# Patient Record
Sex: FEMALE | Race: BLACK OR AFRICAN AMERICAN | Employment: FULL TIME | URBAN - METROPOLITAN AREA
[De-identification: names, ages, dates, MRNs, and addresses within clinical notes are randomized per-mention and may not be internally consistent; named-entity substitution may affect disease eponyms.]

---

## 2017-02-08 ENCOUNTER — HOSPITAL ENCOUNTER (EMERGENCY)
Age: 52
Discharge: HOME | End: 2017-02-08
Attending: FAMILY MEDICINE

## 2017-02-08 VITALS
RESPIRATION RATE: 18 BRPM | TEMPERATURE: 99.2 F | WEIGHT: 202 LBS | BODY MASS INDEX: 37.17 KG/M2 | SYSTOLIC BLOOD PRESSURE: 136 MMHG | OXYGEN SATURATION: 97 % | HEIGHT: 62 IN | DIASTOLIC BLOOD PRESSURE: 77 MMHG | HEART RATE: 100 BPM

## 2017-02-08 DIAGNOSIS — J20.9 ACUTE BRONCHITIS, UNSPECIFIED ORGANISM: Primary | ICD-10-CM

## 2017-02-08 RX ORDER — CODEINE PHOSPHATE AND GUAIFENESIN 10; 100 MG/5ML; MG/5ML
5 SOLUTION ORAL
Qty: 118 ML | Refills: 0 | Status: SHIPPED | OUTPATIENT
Start: 2017-02-08 | End: 2017-11-19

## 2017-02-08 RX ORDER — PREDNISONE 5 MG/1
60 TABLET ORAL
Status: COMPLETED | OUTPATIENT
Start: 2017-02-08 | End: 2017-02-08

## 2017-02-08 RX ORDER — DOXYCYCLINE HYCLATE 100 MG
100 TABLET ORAL 2 TIMES DAILY
Qty: 20 TAB | Refills: 0 | Status: SHIPPED | OUTPATIENT
Start: 2017-02-08 | End: 2017-02-18

## 2017-02-08 RX ORDER — PREDNISONE 20 MG/1
60 TABLET ORAL DAILY
Qty: 15 TAB | Refills: 0 | Status: SHIPPED | OUTPATIENT
Start: 2017-02-08 | End: 2017-02-13

## 2017-02-08 RX ADMIN — PREDNISONE 60 MG: 5 TABLET ORAL at 09:53

## 2017-02-08 NOTE — DISCHARGE INSTRUCTIONS
Bronchitis: Care Instructions  Your Care Instructions    Bronchitis is inflammation of the bronchial tubes, which carry air to the lungs. The tubes swell and produce mucus, or phlegm. The mucus and inflamed bronchial tubes make you cough. You may have trouble breathing. Most cases of bronchitis are caused by viruses like those that cause colds. Antibiotics usually do not help and they may be harmful. Bronchitis usually develops rapidly and lasts about 2 to 3 weeks in otherwise healthy people. Follow-up care is a key part of your treatment and safety. Be sure to make and go to all appointments, and call your doctor if you are having problems. It's also a good idea to know your test results and keep a list of the medicines you take. How can you care for yourself at home? · Take all medicines exactly as prescribed. Call your doctor if you think you are having a problem with your medicine. · Get some extra rest.  · Take an over-the-counter pain medicine, such as acetaminophen (Tylenol), ibuprofen (Advil, Motrin), or naproxen (Aleve) to reduce fever and relieve body aches. Read and follow all instructions on the label. · Do not take two or more pain medicines at the same time unless the doctor told you to. Many pain medicines have acetaminophen, which is Tylenol. Too much acetaminophen (Tylenol) can be harmful. · Take an over-the-counter cough medicine that contains dextromethorphan to help quiet a dry, hacking cough so that you can sleep. Avoid cough medicines that have more than one active ingredient. Read and follow all instructions on the label. · Breathe moist air from a humidifier, hot shower, or sink filled with hot water. The heat and moisture will thin mucus so you can cough it out. · Do not smoke. Smoking can make bronchitis worse. If you need help quitting, talk to your doctor about stop-smoking programs and medicines. These can increase your chances of quitting for good.   When should you call for help? Call 911 anytime you think you may need emergency care. For example, call if:  · You have severe trouble breathing. Call your doctor now or seek immediate medical care if:  · You have new or worse trouble breathing. · You cough up dark brown or bloody mucus (sputum). · You have a new or higher fever. · You have a new rash. Watch closely for changes in your health, and be sure to contact your doctor if:  · You cough more deeply or more often, especially if you notice more mucus or a change in the color of your mucus. · You are not getting better as expected. Where can you learn more? Go to http://[place]-stan.info/. Enter H333 in the search box to learn more about \"Bronchitis: Care Instructions. \"  Current as of: May 23, 2016  Content Version: 11.1  © 2741-7183 [employer]. Care instructions adapted under license by [employer]. (which disclaims liability or warranty for this information). If you have questions about a medical condition or this instruction, always ask your healthcare professional. [place] [address] any warranty or liability for your use of this information. Bronchitis: Care Instructions  Your Care Instructions    Bronchitis is inflammation of the bronchial tubes, which carry air to the lungs. The tubes swell and produce mucus, or phlegm. The mucus and inflamed bronchial tubes make you cough. You may have trouble breathing. Most cases of bronchitis are caused by viruses like those that cause colds. Antibiotics usually do not help and they may be harmful. Bronchitis usually develops rapidly and lasts about 2 to 3 weeks in otherwise healthy people. Follow-up care is a key part of your treatment and safety. Be sure to make and go to all appointments, and call your doctor if you are having problems. It's also a good idea to know your test results and keep a list of the medicines you take.   How can you care for yourself at home? · Take all medicines exactly as prescribed. Call your doctor if you think you are having a problem with your medicine. · Get some extra rest.  · Take an over-the-counter pain medicine, such as acetaminophen (Tylenol), ibuprofen (Advil, Motrin), or naproxen (Aleve) to reduce fever and relieve body aches. Read and follow all instructions on the label. · Do not take two or more pain medicines at the same time unless the doctor told you to. Many pain medicines have acetaminophen, which is Tylenol. Too much acetaminophen (Tylenol) can be harmful. · Take an over-the-counter cough medicine that contains dextromethorphan to help quiet a dry, hacking cough so that you can sleep. Avoid cough medicines that have more than one active ingredient. Read and follow all instructions on the label. · Breathe moist air from a humidifier, hot shower, or sink filled with hot water. The heat and moisture will thin mucus so you can cough it out. · Do not smoke. Smoking can make bronchitis worse. If you need help quitting, talk to your doctor about stop-smoking programs and medicines. These can increase your chances of quitting for good. When should you call for help? Call 911 anytime you think you may need emergency care. For example, call if:  · You have severe trouble breathing. Call your doctor now or seek immediate medical care if:  · You have new or worse trouble breathing. · You cough up dark brown or bloody mucus (sputum). · You have a new or higher fever. · You have a new rash. Watch closely for changes in your health, and be sure to contact your doctor if:  · You cough more deeply or more often, especially if you notice more mucus or a change in the color of your mucus. · You are not getting better as expected. Where can you learn more? Go to http://[place]org[place]-stan.info/. Enter H333 in the search box to learn more about \"Bronchitis: Care Instructions. \"  Current as of: May 23, 2016  Content Version: 11.1  © 3545-0020 [employer]. Care instructions adapted under license by [employer] (which disclaims liability or warranty for this information). If you have questions about a medical condition or this instruction, always ask your healthcare professional. [address] any warranty or liability for your use of this information.

## 2017-02-08 NOTE — UC PROVIDER NOTE
Patient is a 45 y.o. female presenting with cough. The history is provided by the patient. No  was used. Cough   This is a new problem. The current episode started more than 2 days ago. The problem occurs constantly. The problem has been gradually worsening. The cough is productive of sputum. There has been no fever. Associated symptoms include wheezing. Pertinent negatives include no rhinorrhea, no nausea and no vomiting. She has tried inhalers for the symptoms. The treatment provided no relief. She is not a smoker. Her past medical history is significant for bronchitis and asthma. Her past medical history does not include pneumonia. Past Medical History   Diagnosis Date    Acid reflux     Asthma     Environmental allergies     Hypercholesteremia 12/21/2015    Hypertension 6/21/2011        Past Surgical History   Procedure Laterality Date    Hx myomectomy      Hx myomectomy      Pr sinus surgery proc unlisted       x2    Hx heent       wisdom teeth extraction         Family History   Problem Relation Age of Onset    Heart Disease Mother 68     quadruple bypass and valvular surgery    Hypertension Father     Cancer Father      prostate cancer    Heart Disease Father     Diabetes Father      diet controlled    Hypertension Sister     Other Sister      acid reflux        Social History     Social History    Marital status: SINGLE     Spouse name: N/A    Number of children: N/A    Years of education: N/A     Occupational History    Not on file. Social History Main Topics    Smoking status: Never Smoker    Smokeless tobacco: Never Used    Alcohol use No    Drug use: No    Sexual activity: Not on file     Other Topics Concern    Not on file     Social History Narrative                ALLERGIES: Sulfa (sulfonamide antibiotics)    Review of Systems   Constitutional: Negative. HENT: Negative. Negative for rhinorrhea. Eyes: Negative.     Respiratory: Positive for cough and wheezing. Cardiovascular: Negative. Gastrointestinal: Negative. Negative for nausea and vomiting. Endocrine: Negative. Genitourinary: Negative. Musculoskeletal: Negative. Skin: Negative. Allergic/Immunologic: Negative. Neurological: Negative. Hematological: Negative. Psychiatric/Behavioral: Negative. Vitals:    02/08/17 0907   BP: 136/77   Pulse: 100   Resp: 18   Temp: 99.2 °F (37.3 °C)   SpO2: 97%   Weight: 91.6 kg (202 lb)   Height: 5' 2\" (1.575 m)       Physical Exam   Constitutional: She is oriented to person, place, and time. She appears well-developed and well-nourished. HENT:   Head: Normocephalic and atraumatic. Right Ear: External ear normal.   Left Ear: External ear normal.   Nose: Nose normal.   Mouth/Throat: Oropharynx is clear and moist.   Eyes: Conjunctivae and EOM are normal. Pupils are equal, round, and reactive to light. Neck: Normal range of motion. Neck supple. Cardiovascular: Normal rate, regular rhythm and normal heart sounds. Pulmonary/Chest: Effort normal. No respiratory distress. She has wheezes. Musculoskeletal: Normal range of motion. Lymphadenopathy:     She has no cervical adenopathy. Neurological: She is alert and oriented to person, place, and time. Skin: Skin is warm and dry. Psychiatric: She has a normal mood and affect. Her behavior is normal. Judgment and thought content normal.   Nursing note and vitals reviewed. MDM     Differential Diagnosis; Clinical Impression; Plan:     CLINICAL IMPRESSION:  Acute bronchitis, unspecified organism  (primary encounter diagnosis)    Plan:  1. Bronchitis- use your Albuterol MDI  2. Prednisone as directed  3. Follow up with PCP as needed  Risk of Significant Complications, Morbidity, and/or Mortality:   Presenting problems: Moderate  Diagnostic procedures: Moderate  Progress:   Patient progress:  Stable (Offered DuoNeb while in the [facility].  Patient declined as she needed to get to work. )      Procedures

## 2017-04-27 ENCOUNTER — HOSPITAL ENCOUNTER (EMERGENCY)
Age: 52
Discharge: HOME | End: 2017-04-27
Attending: FAMILY MEDICINE

## 2017-04-27 VITALS
TEMPERATURE: 97.8 F | DIASTOLIC BLOOD PRESSURE: 78 MMHG | HEART RATE: 63 BPM | OXYGEN SATURATION: 97 % | WEIGHT: 196 LBS | RESPIRATION RATE: 16 BRPM | HEIGHT: 63 IN | SYSTOLIC BLOOD PRESSURE: 142 MMHG | BODY MASS INDEX: 34.73 KG/M2

## 2017-04-27 DIAGNOSIS — H65.91 FLUID LEVEL BEHIND TYMPANIC MEMBRANE, RIGHT: Primary | ICD-10-CM

## 2017-04-27 RX ORDER — PREDNISONE 10 MG/1
TABLET ORAL
Qty: 48 TAB | Refills: 0 | Status: SHIPPED | OUTPATIENT
Start: 2017-04-27 | End: 2017-11-19

## 2017-04-27 NOTE — UC PROVIDER NOTE
Patient is a 45 y.o. female presenting with ear pain. The history is provided by the patient. Ear Pain    This is a new problem. Episode onset: 2.5 weeks ago started having right ear congestion \"feels like fluid behind ear drum\" ; taking xyzal and dymista daily with improvement. The problem occurs constantly. The problem has not changed since onset. Patient complains that the right ear is affected. There has been no fever. The patient is experiencing no pain. Pertinent negatives include no ear discharge, no headaches, no hearing loss, no rhinorrhea, no sore throat, no abdominal pain, no diarrhea, no vomiting, no neck pain, no cough and no rash. The risk factors include recent URI. Past Medical History:   Diagnosis Date    Acid reflux     Asthma     Environmental allergies     Hypercholesteremia 12/21/2015    Hypertension 6/21/2011        Past Surgical History:   Procedure Laterality Date    HX HEENT      wisdom teeth extraction    HX MYOMECTOMY      HX MYOMECTOMY      SINUS SURGERY PROC UNLISTED      x2         Family History   Problem Relation Age of Onset    Heart Disease Mother 68     quadruple bypass and valvular surgery    Hypertension Father     Cancer Father      prostate cancer    Heart Disease Father     Diabetes Father      diet controlled    Hypertension Sister     Other Sister      acid reflux        Social History     Social History    Marital status: SINGLE     Spouse name: N/A    Number of children: N/A    Years of education: N/A     Occupational History    Not on file. Social History Main Topics    Smoking status: Never Smoker    Smokeless tobacco: Never Used    Alcohol use No    Drug use: No    Sexual activity: Not on file     Other Topics Concern    Not on file     Social History Narrative                ALLERGIES: Sulfa (sulfonamide antibiotics)    Review of Systems   Constitutional: Negative for chills and fever. HENT: Positive for ear pain.  Negative for ear discharge, hearing loss, rhinorrhea and sore throat. Respiratory: Negative for cough. Cardiovascular: Negative for chest pain and palpitations. Gastrointestinal: Negative for abdominal pain, diarrhea and vomiting. Musculoskeletal: Negative for neck pain. Skin: Negative for rash. Neurological: Negative for headaches. Hematological: Negative for adenopathy. Vitals:    04/27/17 1159   BP: 142/78   Pulse: 63   Resp: 16   Temp: 97.8 °F (36.6 °C)   SpO2: 97%   Weight: 88.9 kg (196 lb)   Height: 5' 3\" (1.6 m)       Physical Exam   Constitutional: She appears well-developed and well-nourished. No distress. HENT:   Right Ear: External ear and ear canal normal. A middle ear effusion is present. Left Ear: Tympanic membrane, external ear and ear canal normal.   Nose: No rhinorrhea. Mouth/Throat: Oropharynx is clear and moist and mucous membranes are normal. No oropharyngeal exudate, posterior oropharyngeal edema, posterior oropharyngeal erythema or tonsillar abscesses. Cardiovascular: Normal rate, regular rhythm and normal heart sounds. Pulmonary/Chest: Effort normal. No respiratory distress. She has no decreased breath sounds. She has wheezes in the right upper field, the right lower field, the left upper field and the left lower field. She has no rhonchi. She has no rales. Lymphadenopathy:     She has no cervical adenopathy. Neurological: She is alert. Skin: She is not diaphoretic. Psychiatric: She has a normal mood and affect. Her behavior is normal. Judgment and thought content normal.   Nursing note and vitals reviewed. MDM     Differential Diagnosis; Clinical Impression; Plan:     CLINICAL IMPRESSION:  Fluid level behind tympanic membrane, right  (primary encounter diagnosis)    Plan:  1. Prednisone taper  2. Continue Xyzal  3. F/u with ENT  Risk of Significant Complications, Morbidity, and/or Mortality:   Presenting problems:   Moderate  Management options: Moderate  Progress:   Patient progress:  Stable      Procedures

## 2017-04-27 NOTE — DISCHARGE INSTRUCTIONS
Middle Ear Fluid: Care Instructions  Your Care Instructions    Fluid often builds up inside the ear during a cold or allergies. Usually the fluid drains away, but sometimes a small tube in the ear, called the eustachian tube, stays blocked for months. Symptoms of fluid buildup may include:  · Popping, ringing, or a feeling of fullness or pressure in the ear. · Trouble hearing. · Balance problems and dizziness. In most cases, you can treat yourself at home. Follow-up care is a key part of your treatment and safety. Be sure to make and go to all appointments, and call your doctor if you are having problems. It's also a good idea to know your test results and keep a list of the medicines you take. How can you care for yourself at home? · In most cases, the fluid clears up within a few months without treatment. You may need more tests if the fluid does not clear up after 3 months. · If your doctor prescribed antibiotics, take them as directed. Do not stop taking them just because you feel better. You need to take the full course of antibiotics. When should you call for help? Watch closely for changes in your health, and be sure to contact your doctor if:  · You have pain or a fever. · You have any new symptoms, such as hearing problems. · You do not get better as expected. Where can you learn more? Go to http://ge[place]-stan.info/. Enter A431 in the search box to learn more about \"Middle Ear Fluid: Care Instructions. \"  Current as of: July 29, 2016  Content Version: 11.2  © 5722-5015 [employer]. Care instructions adapted under license by [employer] (which disclaims liability or warranty for this information). If you have questions about a medical condition or this instruction, always ask your healthcare professional. [address] any warranty or liability for your use of this information.

## 2017-09-29 ENCOUNTER — HOSPITAL ENCOUNTER (OUTPATIENT)
Dept: MAMMOGRAPHY | Age: 52
Discharge: HOME | End: 2017-09-29
Attending: OBSTETRICS & GYNECOLOGY
Payer: COMMERCIAL

## 2017-09-29 DIAGNOSIS — Z12.31 VISIT FOR SCREENING MAMMOGRAM: ICD-10-CM

## 2017-09-29 PROCEDURE — 77067 SCR MAMMO BI INCL CAD: CPT

## 2017-11-19 ENCOUNTER — HOSPITAL ENCOUNTER (EMERGENCY)
Age: 52
Discharge: HOME | End: 2017-11-19
Attending: FAMILY MEDICINE

## 2017-11-19 VITALS
WEIGHT: 202 LBS | OXYGEN SATURATION: 98 % | BODY MASS INDEX: 35.79 KG/M2 | SYSTOLIC BLOOD PRESSURE: 188 MMHG | RESPIRATION RATE: 16 BRPM | DIASTOLIC BLOOD PRESSURE: 93 MMHG | HEIGHT: 63 IN | HEART RATE: 74 BPM | TEMPERATURE: 97.7 F

## 2017-11-19 DIAGNOSIS — J01.00 ACUTE MAXILLARY SINUSITIS, RECURRENCE NOT SPECIFIED: Primary | ICD-10-CM

## 2017-11-19 RX ORDER — AZITHROMYCIN 250 MG/1
TABLET, FILM COATED ORAL
Qty: 6 TAB | Refills: 0 | Status: SHIPPED | OUTPATIENT
Start: 2017-11-19 | End: 2018-01-12

## 2017-11-19 NOTE — UC PROVIDER NOTE
Patient is a 45 y.o. female presenting with sinus pain. The history is provided by the patient. Sinus Pain    This is a new problem. The current episode started more than 2 days ago. The problem has been gradually worsening. There has been no fever. The pain is at a severity of 7/10. The pain has been intermittent since onset. Associated symptoms include ear pain. Pertinent negatives include no chills, no sweats and no congestion. She has tried nothing for the symptoms. Past Medical History:   Diagnosis Date    Acid reflux     Asthma     Environmental allergies     Hypercholesteremia 12/21/2015    Hypertension 6/21/2011        Past Surgical History:   Procedure Laterality Date    HX HEENT      wisdom teeth extraction    HX MYOMECTOMY      HX MYOMECTOMY      SINUS SURGERY PROC UNLISTED      x2         Family History   Problem Relation Age of Onset    Heart Disease Mother 68     quadruple bypass and valvular surgery    Hypertension Father     Cancer Father      prostate cancer    Heart Disease Father     Diabetes Father      diet controlled    Hypertension Sister     Other Sister      acid reflux        Social History     Social History    Marital status: SINGLE     Spouse name: N/A    Number of children: N/A    Years of education: N/A     Occupational History    Not on file. Social History Main Topics    Smoking status: Never Smoker    Smokeless tobacco: Never Used    Alcohol use No    Drug use: No    Sexual activity: Not on file     Other Topics Concern    Not on file     Social History Narrative                ALLERGIES: Sulfa (sulfonamide antibiotics)    Review of Systems   Constitutional: Negative for chills. HENT: Positive for ear pain and sinus pain. Negative for congestion.         Vitals:    11/19/17 1545   BP: (!) 188/93   Pulse: 74   Resp: 16   Temp: 97.7 °F (36.5 °C)   SpO2: 98%   Weight: 91.6 kg (202 lb)   Height: 5' 3\" (1.6 m)       Physical Exam   Constitutional: She is oriented to person, place, and time. She appears well-developed and well-nourished. HENT:   Right Ear: External ear normal.   Left Ear: External ear normal.   Eyes: Conjunctivae and EOM are normal.   Cardiovascular: Normal rate and regular rhythm. Pulmonary/Chest: Effort normal and breath sounds normal.   Neurological: She is alert and oriented to person, place, and time. Skin: Skin is warm and dry. Psychiatric: She has a normal mood and affect. Her behavior is normal. Judgment and thought content normal.   Nursing note and vitals reviewed. MDM     Differential Diagnosis; Clinical Impression; Plan:     CLINICAL IMPRESSION:  Acute maxillary sinusitis, recurrence not specified  (primary encounter diagnosis)    Plan:  1. Zithromax  2.   3.   Risk of Significant Complications, Morbidity, and/or Mortality:   Presenting problems: Moderate  Diagnostic procedures: Moderate  Management options:   Moderate  Progress:   Patient progress:  Stable      Procedures

## 2017-11-19 NOTE — DISCHARGE INSTRUCTIONS
Sinusitis: Care Instructions  Your Care Instructions    Sinusitis is an infection of the lining of the sinus cavities in your head. Sinusitis often follows a cold. It causes pain and pressure in your head and face. In most cases, sinusitis gets better on its own in 1 to 2 weeks. But some mild symptoms may last for several weeks. Sometimes antibiotics are needed. Follow-up care is a key part of your treatment and safety. Be sure to make and go to all appointments, and call your doctor if you are having problems. It's also a good idea to know your test results and keep a list of the medicines you take. How can you care for yourself at home? · Take an over-the-counter pain medicine, such as acetaminophen (Tylenol), ibuprofen (Advil, Motrin), or naproxen (Aleve). Read and follow all instructions on the label. · If the doctor prescribed antibiotics, take them as directed. Do not stop taking them just because you feel better. You need to take the full course of antibiotics. · Be careful when taking over-the-counter cold or flu medicines and Tylenol at the same time. Many of these medicines have acetaminophen, which is Tylenol. Read the labels to make sure that you are not taking more than the recommended dose. Too much acetaminophen (Tylenol) can be harmful. · Breathe warm, moist air from a steamy shower, a hot bath, or a sink filled with hot water. Avoid cold, dry air. Using a humidifier in your home may help. Follow the directions for cleaning the machine. · Use saline (saltwater) nasal washes to help keep your nasal passages open and wash out mucus and bacteria. You can buy saline nose drops at a grocery store or drugstore. Or you can make your own at home by adding 1 teaspoon of salt and 1 teaspoon of baking soda to 2 cups of distilled water. If you make your own, fill a bulb syringe with the solution, insert the tip into your nostril, and squeeze gently. [place] your nose.   · Put a hot, wet towel or a warm gel pack on your face 3 or 4 times a day for 5 to 10 minutes each time. · Try a decongestant nasal spray like oxymetazoline (Afrin). Do not use it for more than 3 days in a row. Using it for more than 3 days can make your congestion worse. When should you call for help? Call your doctor now or seek immediate medical care if:  ? · You have new or worse swelling or redness in your face or around your eyes. ? · You have a new or higher fever. ? Watch closely for changes in your health, and be sure to contact your doctor if:  ? · You have new or worse facial pain. ? · The mucus from your nose becomes thicker (like pus) or has new blood in it. ? · You are not getting better as expected. Where can you learn more? Go to http://ge[place]-stan.info/. Enter J255 in the search box to learn more about \"Sinusitis: Care Instructions. \"  Current as of: May 12, 2017  Content Version: 11.4  © 4131-5474 [employer]. Care instructions adapted under license by [employer] (which disclaims liability or warranty for this information). If you have questions about a medical condition or this instruction, always ask your healthcare professional. [address] any warranty or liability for your use of this information.

## 2018-01-05 ENCOUNTER — HOSPITAL ENCOUNTER (EMERGENCY)
Age: 53
Discharge: STILL ADMITTED OR ADMINISTRATIVE | End: 2018-01-05
Attending: FAMILY MEDICINE

## 2018-01-12 ENCOUNTER — HOSPITAL ENCOUNTER (EMERGENCY)
Age: 53
Discharge: HOME | End: 2018-01-12
Attending: FAMILY MEDICINE

## 2018-01-12 ENCOUNTER — APPOINTMENT (OUTPATIENT)
Dept: GENERAL RADIOLOGY | Age: 53
End: 2018-01-12
Attending: FAMILY MEDICINE

## 2018-01-12 VITALS
TEMPERATURE: 98.1 F | OXYGEN SATURATION: 98 % | HEIGHT: 67 IN | SYSTOLIC BLOOD PRESSURE: 134 MMHG | WEIGHT: 209 LBS | DIASTOLIC BLOOD PRESSURE: 83 MMHG | BODY MASS INDEX: 32.8 KG/M2 | RESPIRATION RATE: 20 BRPM | HEART RATE: 70 BPM

## 2018-01-12 DIAGNOSIS — M25.531 ACUTE PAIN OF RIGHT WRIST: Primary | ICD-10-CM

## 2018-01-12 RX ORDER — PREDNISONE 10 MG/1
TABLET ORAL
Qty: 21 TAB | Refills: 0 | Status: SHIPPED | OUTPATIENT
Start: 2018-01-12 | End: 2018-06-15

## 2018-01-12 NOTE — UC PROVIDER NOTE
Patient is a 45 y.o. female presenting with wrist pain. The history is provided by the patient. Wrist Pain    This is a new problem. The current episode started more than 1 week ago. The problem occurs daily. The problem has not changed since onset. The pain is present in the right wrist. The quality of the pain is described as aching. The pain is at a severity of 3/10. The pain is mild. Pertinent negatives include no numbness, full range of motion and no stiffness. Associated symptoms comments: Local tenderness and pain with movements. The symptoms are aggravated by standing, movement and palpation. She has tried nothing for the symptoms. There has been no history of extremity trauma (pain is more with direct pressure on computer pad ). Past Medical History:   Diagnosis Date    Acid reflux     Asthma     Environmental allergies     Hypercholesteremia 12/21/2015    Hypertension 6/21/2011        Past Surgical History:   Procedure Laterality Date    HX HEENT      wisdom teeth extraction    HX MYOMECTOMY      HX MYOMECTOMY      SINUS SURGERY PROC UNLISTED      x2         Family History   Problem Relation Age of Onset    Heart Disease Mother 68     quadruple bypass and valvular surgery    Hypertension Father     Cancer Father      prostate cancer    Heart Disease Father     Diabetes Father      diet controlled    Hypertension Sister     Other Sister      acid reflux        Social History     Social History    Marital status: SINGLE     Spouse name: N/A    Number of children: N/A    Years of education: N/A     Occupational History    Not on file.      Social History Main Topics    Smoking status: Never Smoker    Smokeless tobacco: Never Used    Alcohol use No    Drug use: No    Sexual activity: Not on file     Other Topics Concern    Not on file     Social History Narrative                ALLERGIES: Sulfa (sulfonamide antibiotics)    Review of Systems   Musculoskeletal: Negative for stiffness. Neurological: Negative for numbness. All other systems reviewed and are negative. Vitals:    01/12/18 1149   BP: 134/83   Pulse: 70   Resp: 20   Temp: 98.1 °F (36.7 °C)   SpO2: 98%   Weight: 94.8 kg (209 lb)   Height: 5' 7\" (1.702 m)       Physical Exam   Musculoskeletal:        Right wrist: She exhibits tenderness (on medial at base of 1st MCP and lower radius). She exhibits normal range of motion, no swelling and no effusion. Right hand: Normal.   Nursing note and vitals reviewed. MDM     Differential Diagnosis; Clinical Impression; Plan:     CLINICAL IMPRESSION:  Acute pain of right wrist  (primary encounter diagnosis)      DDX    Plan:    Xray- normal    Wrist brace  Use NSAID and if not better use prednisone  Self exercise. Amount and/or Complexity of Data Reviewed:   Tests in the radiology section of CPT®:  Ordered and reviewed   Review and summarize past medical records:  Yes  Risk of Significant Complications, Morbidity, and/or Mortality:   Presenting problems: Moderate  Diagnostic procedures: Moderate  Management options:   Moderate  Progress:   Patient progress:  Stable      Procedures

## 2018-01-12 NOTE — DISCHARGE INSTRUCTIONS
Wrist Tendinitis: Exercises  Your Care Instructions  Here are some examples of typical rehabilitation exercises for your condition. Start each exercise slowly. Ease off the exercise if you start to have pain. Your doctor or your physical or occupational therapist will tell you when you can start these exercises and which ones will work best for you. How to do the exercises  Wrist flexion and extension    1. Place your forearm on a table, with your hand and affected wrist extended beyond the table, palm down. 2. Bend your wrist to move your hand upward and allow your hand to close into a fist, then lower your hand and allow your fingers to relax. Hold each position for about 6 seconds. 3. Repeat 8 to 12 times. Hand flips    1. While seated, place your forearm and affected wrist on your thigh, palm down. 2. Flip your hand over so the back of your hand rests on your thigh and your palm is up. Alternate between palm up and palm down while keeping your forearm on your thigh. 3. Repeat 8 to 12 times. Wrist radial and ulnar deviation    1. Hold your affected hand out in front of you, palm down. 2. Slowly bend your wrist as far as you can from side to side. Hold each position for about 6 seconds. 3. Repeat 8 to 12 times. Wrist extensor stretch    1. Extend the arm with the affected wrist in front of you and point your fingers toward the floor. 2. With your other hand, gently bend your wrist farther until you feel a mild to moderate stretch in your forearm. 3. Hold the stretch for at least 15 to 30 seconds. 4. Repeat 2 to 4 times. 5. When you can do this stretch with ease and no pain, repeat steps 1 through 4. But this time extend your affected arm in front of you and make a fist with your palm facing down. Then bend your wrist, pointing your fist toward the floor. Wrist flexor stretch    1. Extend the arm with the affected wrist in front of you with your palm facing away from your body.   2. Bend back your wrist, pointing your hand up toward the ceiling. 3. With your other hand, gently bend your wrist farther until you feel a mild to moderate stretch in your forearm. 4. Hold the stretch for at least 15 to 30 seconds. 5. Repeat 2 to 4 times. 6. Repeat steps 1 through 5, but this time extend your affected arm in front of you with your palm facing up. Then bend back your wrist, pointing your hand toward the floor. Follow-up care is a key part of your treatment and safety. Be sure to make and go to all appointments, and call your doctor if you are having problems. It's also a good idea to know your test results and keep a list of the medicines you take. Where can you learn more? Go to http://georgia-stan.info/. Enter U023 in the search box to learn more about \"Wrist Tendinitis: Exercises. \"  Current as of: March 21, 2017  Content Version: 11.4  © 3429-3055 Healthwise, Incorporated. Care instructions adapted under license by [employer] (which disclaims liability or warranty for this information). If you have questions about a medical condition or this instruction, always ask your healthcare professional. [address] any warranty or liability for your use of this information.

## 2018-06-15 ENCOUNTER — OFFICE VISIT (OUTPATIENT)
Dept: URGENT CARE | Age: 53
End: 2018-06-15

## 2018-06-15 VITALS
SYSTOLIC BLOOD PRESSURE: 146 MMHG | HEART RATE: 75 BPM | DIASTOLIC BLOOD PRESSURE: 66 MMHG | RESPIRATION RATE: 16 BRPM | BODY MASS INDEX: 37.73 KG/M2 | WEIGHT: 205 LBS | OXYGEN SATURATION: 95 % | TEMPERATURE: 97.2 F | HEIGHT: 62 IN

## 2018-06-15 DIAGNOSIS — M76.61 ACHILLES TENDINITIS OF RIGHT LOWER EXTREMITY: Primary | ICD-10-CM

## 2018-06-15 NOTE — PATIENT INSTRUCTIONS
Rest and seek medical care for increased problems, any questions or concern including but not limited to the ones discussed with you here today. Tendon Injury (Tendinopathy): Care Instructions  Your Care Instructions    Tendons are tough, flexible tissues that connect muscle to bone. A tendon can hurt or get torn from overuse or aging, especially tendons in the shoulder, elbow, wrist, hip, knee, or ankle. Tendon injuries may be called tendinopathy or tendinitis. Tendon injuries can occur from any motion you have to repeat in a job, sports, or daily activities. Tennis elbow is one common tendon injury. You can treat most tendon problems with over-the-counter pain medicine, rest, changes in your activities, and physical therapy. Follow-up care is a key part of your treatment and safety. Be sure to make and go to all appointments, and call your doctor if you are having problems. It's also a good idea to know your test results and keep a list of the medicines you take. How can you care for yourself at home? · Rest the sore area. You may have to stop doing the activity that caused the tendon pain for a while. · Take an over-the-counter pain medicine, such as acetaminophen (Tylenol), ibuprofen (Advil, Motrin), or naproxen (Aleve). Read and follow all instructions on the label. · Do not take two or more pain medicines at the same time unless the doctor told you to. Many pain medicines have acetaminophen, which is Tylenol. Too much acetaminophen (Tylenol) can be harmful. · Put ice or a cold pack on the sore area for 10 to 20 minutes at a time. Try to do this every 1 to 2 hours for the next 3 days (when you are awake) or until any swelling goes down. Put a thin cloth between the ice and your skin. · Prop up the sore area on a pillow when you ice it or anytime you sit or lie down during the next 3 days. Try to keep it above the level of your heart. This will help reduce swelling.   · Follow your doctor's advice for wearing and caring for a sling, splint, or cast. In some cases, you may wear one of these for a while to help your tendon heal.  · Follow your doctor's advice for stretching and physical therapy. Gently move your joint through its full range of motion. This will prevent stiffness in your joint. · Go back to your activity slowly. Warm up before and stretch after the activity. You also can try making some changes. For example, if a sport caused your tendon pain, alternate the sport with another activity. If using a tool causes pain, switch hands or change your . Stop the activity if it hurts. After the activity, apply ice to prevent pain and swelling. · Do not smoke. Smoking can slow healing. If you need help quitting, talk to your doctor about stop-smoking programs and medicines. These can increase your chances of quitting for good. When should you call for help? Watch closely for changes in your health, and be sure to contact your doctor if:  ? · Your pain gets worse. ? · You do not get better as expected. Where can you learn more? Go to http://georg[place]-stan.info/. Enter A157 in the search box to learn more about \"Tendon Injury (Tendinopathy): Care Instructions. \"  Current as of: March 21, 2017  Content Version: 11.4  © 7721-8394 Healthwise, Incorporated. Care instructions adapted under license by [employer] (which disclaims liability or warranty for this information). If you have questions about a medical condition or this instruction, always ask your healthcare professional. [place] [address] any warranty or liability for your use of this information.

## 2018-06-15 NOTE — MR AVS SNAPSHOT
[address] [facility] [address] 
[phone] Patient: Jaqui Roberson MRN: [id number] :1965 Visit Information Date & Time Provider Department Dept. Phone Encounter #  
 6/15/2018  9:30 AM [facility] [phone] [id number] Upcoming Health Maintenance Date Due Hepatitis C Screening 1965 Pneumococcal 19-64 Medium Risk (1 of 1 - PPSV23) 1982 DTaP/Tdap/Td series (1 - Tdap) 1984 FOBT Q 1 YEAR AGE 50-75 2013 PAP AKA CERVICAL CYTOLOGY 2013 Influenza Age 5 to Adult 2018 BREAST CANCER SCRN MAMMOGRAM 2019 Allergies as of 6/15/2018  Review Complete On: 6/15/2018 By: Yesenia Katz RN Severity Noted Reaction Type Reactions Sulfa (Sulfonamide Antibiotics)  2011    Hives Current Immunizations  Reviewed on 2016 No immunizations on file. Not reviewed this visit You Were Diagnosed With   
  
 Codes Comments Achilles tendinitis of right lower extremity    -  Primary ICD-10-CM: M76.61 
ICD-9-CM: 726.71 Vitals BP Pulse Temp Resp Height(growth percentile) Weight(growth percentile) 146/66 75 97.2 °F (36.2 °C) 16 5' 2\" (1.575 m) 205 lb (93 kg) SpO2 BMI OB Status Smoking Status 95% 37.49 kg/m2 Menopause Never Smoker BMI and BSA Data Body Mass Index Body Surface Area  
 37.49 kg/m 2 2.02 m 2 Preferred Pharmacy Pharmacy Name Phone [facility], [address] [place] [address] AT [address] [phone] Your Updated Medication List  
  
   
This list is accurate as of 6/15/18  9:58 AM.  Always use your most recent med list.  
  
  
  
  
 * albuterol 90 mcg/actuation inhaler Commonly known as:  PROVENTIL HFA, VENTOLIN HFA, PROAIR HFA Take 2 Puffs by inhalation every four (4) hours as needed for Wheezing. * albuterol 2.5 mg /3 mL (0.083 %) nebulizer solution Commonly known as:  PROVENTIL VENTOLIN  
3 mL by Nebulization route every four (4) hours as needed for Wheezing. albuterol-ipratropium 2.5 mg-0.5 mg/3 ml Nebu Commonly known as:  DUO-NEB  
3 mL by Nebulization route every six (6) hours as needed. felodipine 5 mg 24 hr tablet Commonly known as:  PLENDIL SR  
TAKE ONE TABLET BY MOUTH EVERY DAY  
  
 FLOVENT  mcg/actuation inhaler Generic drug:  fluticasone INHALE 2 PUFFS BY MOUTH TWICE DAILY AS DIRECTED  
  
 montelukast 10 mg tablet Commonly known as:  SINGULAIR  
TAKE ONE TABLET BY MOUTH EVERY DAY. GENERIC FOR SINGULAIR  
  
 simvastatin 20 mg tablet Commonly known as:  ZOCOR  
TAKE ONE TABLET BY MOUTH EVERY EVENING. GENERIC FOR ZOCOR  
  
 triamterene-hydroCHLOROthiazide 37.5-25 mg per tablet Commonly known as:  Wyn Brake TAKE 1 TABLET BY MOUTH DAILY * Notice: This list has 2 medication(s) that are the same as other medications prescribed for you. Read the directions carefully, and ask your doctor or other care provider to review them with you. Patient Instructions Rest and seek medical care for increased problems, any questions or concern including but not limited to the ones discussed with you here today. Tendon Injury (Tendinopathy): Care Instructions Your Care Instructions Tendons are tough, flexible tissues that connect muscle to bone. A tendon can hurt or get torn from overuse or aging, especially tendons in the shoulder, elbow, wrist, hip, knee, or ankle. Tendon injuries may be called tendinopathy or tendinitis. Tendon injuries can occur from any motion you have to repeat in a job, sports, or daily activities. Tennis elbow is one common tendon injury. You can treat most tendon problems with over-the-counter pain medicine, rest, changes in your activities, and physical therapy. Follow-up care is a key part of your treatment and safety. Be sure to make and go to all appointments, and call your doctor if you are having problems. It's also a good idea to know your test results and keep a list of the medicines you take. How can you care for yourself at home? · Rest the sore area. You may have to stop doing the activity that caused the tendon pain for a while. · Take an over-the-counter pain medicine, such as acetaminophen (Tylenol), ibuprofen (Advil, Motrin), or naproxen (Aleve). Read and follow all instructions on the label. · Do not take two or more pain medicines at the same time unless the doctor told you to. Many pain medicines have acetaminophen, which is Tylenol. Too much acetaminophen (Tylenol) can be harmful. · Put ice or a cold pack on the sore area for 10 to 20 minutes at a time. Try to do this every 1 to 2 hours for the next 3 days (when you are awake) or until any swelling goes down. Put a thin cloth between the ice and your skin. · Prop up the sore area on a pillow when you ice it or anytime you sit or lie down during the next 3 days. Try to keep it above the level of your heart. This will help reduce swelling. · Follow your doctor's advice for wearing and caring for a sling, splint, or cast. In some cases, you may wear one of these for a while to help your tendon heal. 
· Follow your doctor's advice for stretching and physical therapy. Gently move your joint through its full range of motion. This will prevent stiffness in your joint. · Go back to your activity slowly. Warm up before and stretch after the activity. You also can try making some changes. For example, if a sport caused your tendon pain, alternate the sport with another activity. If using a tool causes pain, switch hands or change your . Stop the activity if it hurts. After the activity, apply ice to prevent pain and swelling. · Do not smoke. Smoking can slow healing.  If you need help quitting, talk to your doctor about stop-smoking programs and medicines. These can increase your chances of quitting for good. When should you call for help? Watch closely for changes in your health, and be sure to contact your doctor if: 
? · Your pain gets worse. ? · You do not get better as expected. Where can you learn more? Go to http://georgia-stan.info/. Enter A157 in the search box to learn more about \"Tendon Injury (Tendinopathy): Care Instructions. \" Current as of: March 21, 2017 Content Version: 11.4 © 4453-2758 [employer]. Care instructions adapted under license by [employer] (which disclaims liability or warranty for this information). If you have questions about a medical condition or this instruction, always ask your healthcare professional. [address] any warranty or liability for your use of this information. Introducing [facility]! Dear [employer]: Thank you for requesting a [employer] account. Our records indicate that you already have an active [employer] account. You can access your account anytime at [email or web address] Did you know that you can access your hospital and ER discharge instructions at any time in [employer]? You can also review all of your test results from your hospital stay or ER visit. Additional Information If you have questions, please visit the Frequently Asked Questions section of the [employer] website at [email or web address] Remember, [employer] is NOT to be used for urgent needs. For medical emergencies, dial 911. Now available from your iPhone and Android! Please provide this summary of care documentation to your next provider. Your primary care clinician is listed as [address]. If you have any questions after today's visit, please call [phone].

## 2018-06-15 NOTE — PROGRESS NOTES
Patient is a 52 y.o. female presenting with plantar faciitis. The history is provided by the patient. Plantar Fasciitis   This is a new problem. The current episode started more than 1 week ago. Episode frequency: intermitent rt achilles pain for about a month. The problem has not changed since onset. Exacerbated by: certain activities and walking up stairs. The symptoms are relieved by rest. She has tried rest for the symptoms. The treatment provided mild relief. Past Medical History:   Diagnosis Date    Acid reflux     Asthma     Environmental allergies     Hypercholesteremia 12/21/2015    Hypertension 6/21/2011        Past Surgical History:   Procedure Laterality Date    HX HEENT      wisdom teeth extraction    HX MYOMECTOMY      HX MYOMECTOMY      SINUS SURGERY PROC UNLISTED      x2         Family History   Problem Relation Age of Onset    Heart Disease Mother 68     quadruple bypass and valvular surgery    Hypertension Father     Cancer Father      prostate cancer    Heart Disease Father     Diabetes Father      diet controlled    Hypertension Sister     Other Sister      acid reflux        Social History     Social History    Marital status: SINGLE     Spouse name: N/A    Number of children: N/A    Years of education: N/A     Occupational History    Not on file. Social History Main Topics    Smoking status: Never Smoker    Smokeless tobacco: Never Used    Alcohol use No    Drug use: No    Sexual activity: Not on file     Other Topics Concern    Not on file     Social History Narrative                ALLERGIES: Sulfa (sulfonamide antibiotics)    Review of Systems   Constitutional: Negative. Musculoskeletal: Negative. Negative for arthralgias, gait problem, joint swelling and myalgias. Tenderness at the achilles insertion site on the heel   Neurological: Negative. Hematological: Negative.         Vitals:    06/15/18 0942   BP: 146/66   Pulse: 75   Resp: 16 Temp: 97.2 °F (36.2 °C)   SpO2: 95%   Weight: 205 lb (93 kg)   Height: 5' 2\" (1.575 m)       Physical Exam   Constitutional: She is oriented to person, place, and time. She appears well-developed and well-nourished. HENT:   Head: Normocephalic and atraumatic. Mouth/Throat: Oropharynx is clear and moist.   Cardiovascular: Normal rate, regular rhythm and normal heart sounds. Pulmonary/Chest: Effort normal and breath sounds normal.   Musculoskeletal: Normal range of motion. She exhibits tenderness. She exhibits no edema or deformity. Tenderness at the achilles insertion site on the heel without swelling, redness or step offs. No pain along the plantar surface, No calf tenderness. FROM and no weakness. NVI distally   Neurological: She is alert and oriented to person, place, and time. Skin: Skin is warm and dry. No rash noted. No erythema. Psychiatric: She has a normal mood and affect. Her behavior is normal. Thought content normal.   Nursing note and vitals reviewed. MDM    Procedures                         ICD-10-CM ICD-9-CM    1. Achilles tendinitis of right lower extremity M76.61 726.71      No orders of the defined types were placed in this encounter. The patients condition was discussed with the patient and they understand. The patient is to follow up with primary care doctor ,If signs and symptoms become worse the pt is to go to the ER. The patient is to take medications as prescribed.

## 2018-10-16 ENCOUNTER — OFFICE VISIT (OUTPATIENT)
Dept: URGENT CARE | Age: 53
End: 2018-10-16

## 2018-10-16 VITALS
BODY MASS INDEX: 36.32 KG/M2 | HEART RATE: 62 BPM | TEMPERATURE: 97 F | HEIGHT: 63 IN | SYSTOLIC BLOOD PRESSURE: 142 MMHG | WEIGHT: 205 LBS | RESPIRATION RATE: 18 BRPM | OXYGEN SATURATION: 97 % | DIASTOLIC BLOOD PRESSURE: 83 MMHG

## 2018-10-16 DIAGNOSIS — J32.9 SINUSITIS, UNSPECIFIED CHRONICITY, UNSPECIFIED LOCATION: Primary | ICD-10-CM

## 2018-10-16 RX ORDER — AMOXICILLIN AND CLAVULANATE POTASSIUM 875; 125 MG/1; MG/1
1 TABLET, FILM COATED ORAL 2 TIMES DAILY
Qty: 20 TAB | Refills: 0 | Status: SHIPPED | OUTPATIENT
Start: 2018-10-16 | End: 2018-10-26

## 2018-10-16 RX ORDER — MINERAL OIL
ENEMA (ML) RECTAL
COMMUNITY

## 2018-10-16 NOTE — MR AVS SNAPSHOT
[address] [facility] [address] 
[phone] Patient: Rachel Bledsoe MRN: [id number] :1965 Visit Information Date & Time Provider Department Dept. Phone Encounter #  
 10/16/2018  9:15 AM [address] [facility] [phone] [id number] Upcoming Health Maintenance Date Due Hepatitis C Screening 1965 Pneumococcal 19-64 Medium Risk (1 of 1 - PPSV23) 1982 DTaP/Tdap/Td series (1 - Tdap) 1984 Shingrix Vaccine Age 50> (1 of 2) 2013 FOBT Q 1 YEAR AGE 50-75 2013 PAP AKA CERVICAL CYTOLOGY 2013 Influenza Age 5 to Adult 2018 BREAST CANCER SCRN MAMMOGRAM 2019 Allergies as of 10/16/2018  Review Complete On: 10/16/2018 By: Chad Drew MD  
  
 Severity Noted Reaction Type Reactions Sulfa (Sulfonamide Antibiotics)  2011    Hives Current Immunizations  Reviewed on 2016 No immunizations on file. Not reviewed this visit You Were Diagnosed With   
  
 Codes Comments Sinusitis, unspecified chronicity, unspecified location    -  Primary ICD-10-CM: J32.9 ICD-9-CM: 473.9 Vitals BP Pulse Temp Resp Height(growth percentile) Weight(growth percentile) 142/83 62 97 °F (36.1 °C) 18 5' 3\" (1.6 m) 205 lb (93 kg) SpO2 BMI OB Status Smoking Status 97% 36.31 kg/m2 Menopause Never Smoker BMI and BSA Data Body Mass Index Body Surface Area  
 36.31 kg/m 2 2.03 m 2 Preferred Pharmacy Pharmacy Name Phone [facility], [address] [place] [address] AT [address] [place] [address] Drive [phone] Your Updated Medication List  
  
   
This list is accurate as of 10/16/18  9:25 AM.  Always use your most recent med list.  
  
  
  
  
 * albuterol 90 mcg/actuation inhaler Commonly known as:  PROVENTIL HFA, VENTOLIN HFA, PROAIR HFA  
 Take 2 Puffs by inhalation every four (4) hours as needed for Wheezing. * albuterol 2.5 mg /3 mL (0.083 %) nebulizer solution Commonly known as:  PROVENTIL VENTOLIN  
3 mL by Nebulization route every four (4) hours as needed for Wheezing. albuterol-ipratropium 2.5 mg-0.5 mg/3 ml Nebu Commonly known as:  DUO-NEB  
3 mL by Nebulization route every six (6) hours as needed. amoxicillin-clavulanate 875-125 mg per tablet Commonly known as:  AUGMENTIN Take 1 Tab by mouth two (2) times a day for 10 days. felodipine 5 mg 24 hr tablet Commonly known as:  PLENDIL SR  
TAKE ONE TABLET BY MOUTH EVERY DAY  
  
 fexofenadine 180 mg tablet Commonly known as:  Ansley Mealy Take  by mouth. FLOVENT  mcg/actuation inhaler Generic drug:  fluticasone INHALE 2 PUFFS BY MOUTH TWICE DAILY AS DIRECTED  
  
 montelukast 10 mg tablet Commonly known as:  SINGULAIR  
TAKE ONE TABLET BY MOUTH EVERY DAY. GENERIC FOR SINGULAIR  
  
 simvastatin 20 mg tablet Commonly known as:  ZOCOR  
TAKE ONE TABLET BY MOUTH EVERY EVENING. GENERIC FOR ZOCOR  
  
 triamterene-hydroCHLOROthiazide 37.5-25 mg per tablet Commonly known as:  Montine Jewels TAKE 1 TABLET BY MOUTH DAILY * Notice: This list has 2 medication(s) that are the same as other medications prescribed for you. Read the directions carefully, and ask your doctor or other care provider to review them with you. Prescriptions Sent to Pharmacy Refills  
 amoxicillin-clavulanate (AUGMENTIN) 875-125 mg per tablet 0 Sig: Take 1 Tab by mouth two (2) times a day for 10 days. Class: Normal  
 Pharmacy: [facility] [address] AT [address] Ph #: [phone] Route: Oral  
  
Patient Instructions Sinusitis: Care Instructions Your Care Instructions Sinusitis is an infection of the lining of the sinus cavities in your head. Sinusitis often follows a cold. It causes pain and pressure in your head and face. In most cases, sinusitis gets better on its own in 1 to 2 weeks. But some mild symptoms may last for several weeks. Sometimes antibiotics are needed. Follow-up care is a key part of your treatment and safety. Be sure to make and go to all appointments, and call your doctor if you are having problems. It's also a good idea to know your test results and keep a list of the medicines you take. How can you care for yourself at home? · Take an over-the-counter pain medicine, such as acetaminophen (Tylenol), ibuprofen (Advil, Motrin), or naproxen (Aleve). Read and follow all instructions on the label. · If the doctor prescribed antibiotics, take them as directed. Do not stop taking them just because you feel better. You need to take the full course of antibiotics. · Be careful when taking over-the-counter cold or flu medicines and Tylenol at the same time. Many of these medicines have acetaminophen, which is Tylenol. Read the labels to make sure that you are not taking more than the recommended dose. Too much acetaminophen (Tylenol) can be harmful. · Breathe warm, moist air from a steamy shower, a hot bath, or a sink filled with hot water. Avoid cold, dry air. Using a humidifier in your home may help. Follow the directions for cleaning the machine. · Use saline (saltwater) nasal washes to help keep your nasal passages open and wash out mucus and bacteria. You can buy saline nose drops at a grocery store or drugstore. Or you can make your own at home by adding 1 teaspoon of salt and 1 teaspoon of baking soda to 2 cups of distilled water. If you make your own, fill a bulb syringe with the solution, insert the tip into your nostril, and squeeze gently. Torrey De La Torrea your nose. · Put a hot, wet towel or a warm gel pack on your face 3 or 4 times a day for 5 to 10 minutes each time. · Try a decongestant nasal spray like oxymetazoline (Afrin). Do not use it for more than 3 days in a row. Using it for more than 3 days can make your congestion worse. When should you call for help? Call your doctor now or seek immediate medical care if: 
  · You have new or worse swelling or redness in your face or around your eyes.  
  · You have a new or higher fever.  
 Watch closely for changes in your health, and be sure to contact your doctor if: 
  · You have new or worse facial pain.  
  · The mucus from your nose becomes thicker (like pus) or has new blood in it.  
  · You are not getting better as expected. Where can you learn more? Go to http://georgia-stan.info/. Enter Q424 in the search box to learn more about \"Sinusitis: Care Instructions. \" Current as of: March 28, 2018 Content Version: 11.8 © 9038-6159 [employer]. Care instructions adapted under license by AlertaPhone (which disclaims liability or warranty for this information). If you have questions about a medical condition or this instruction, always ask your healthcare professional. [place] [address] any warranty or liability for your use of this information. Introducing [facility]! Dear Tino Schaumann: Thank you for requesting a Golgi account. Our records indicate that you already have an active Golgi account. You can access your account anytime at [email or web address] Did you know that you can access your hospital and ER discharge instructions at any time in Golgi? You can also review all of your test results from your hospital stay or ER visit. Additional Information If you have questions, please visit the Frequently Asked Questions section of the Golgi website at [email or web address] Remember, Golgi is NOT to be used for urgent needs. For medical emergencies, dial 911. Now available from your iPhone and Android! Please provide this summary of care documentation to your next provider. Your primary care clinician is listed as [address]. If you have any questions after today's visit, please call [phone].

## 2018-10-16 NOTE — PATIENT INSTRUCTIONS
Sinusitis: Care Instructions  Your Care Instructions    Sinusitis is an infection of the lining of the sinus cavities in your head. Sinusitis often follows a cold. It causes pain and pressure in your head and face. In most cases, sinusitis gets better on its own in 1 to 2 weeks. But some mild symptoms may last for several weeks. Sometimes antibiotics are needed. Follow-up care is a key part of your treatment and safety. Be sure to make and go to all appointments, and call your doctor if you are having problems. It's also a good idea to know your test results and keep a list of the medicines you take. How can you care for yourself at home? · Take an over-the-counter pain medicine, such as acetaminophen (Tylenol), ibuprofen (Advil, Motrin), or naproxen (Aleve). Read and follow all instructions on the label. · If the doctor prescribed antibiotics, take them as directed. Do not stop taking them just because you feel better. You need to take the full course of antibiotics. · Be careful when taking over-the-counter cold or flu medicines and Tylenol at the same time. Many of these medicines have acetaminophen, which is Tylenol. Read the labels to make sure that you are not taking more than the recommended dose. Too much acetaminophen (Tylenol) can be harmful. · Breathe warm, moist air from a steamy shower, a hot bath, or a sink filled with hot water. Avoid cold, dry air. Using a humidifier in your home may help. Follow the directions for cleaning the machine. · Use saline (saltwater) nasal washes to help keep your nasal passages open and wash out mucus and bacteria. You can buy saline nose drops at a grocery store or drugstore. Or you can make your own at home by adding 1 teaspoon of salt and 1 teaspoon of baking soda to 2 cups of distilled water. If you make your own, fill a bulb syringe with the solution, insert the tip into your nostril, and squeeze gently. Meri Cesar your nose.   · Put a hot, wet towel or a warm gel pack on your face 3 or 4 times a day for 5 to 10 minutes each time. · Try a decongestant nasal spray like oxymetazoline (Afrin). Do not use it for more than 3 days in a row. Using it for more than 3 days can make your congestion worse. When should you call for help? Call your doctor now or seek immediate medical care if:    · You have new or worse swelling or redness in your face or around your eyes.     · You have a new or higher fever.    Watch closely for changes in your health, and be sure to contact your doctor if:    · You have new or worse facial pain.     · The mucus from your nose becomes thicker (like pus) or has new blood in it.     · You are not getting better as expected. Where can you learn more? Go to http://georgia-stan.info/. Enter A453 in the search box to learn more about \"Sinusitis: Care Instructions. \"  Current as of: March 28, 2018  Content Version: 11.8  © 9770-2601 [employer]. Care instructions adapted under license by Broad[employer] (which disclaims liability or warranty for this information). If you have questions about a medical condition or this instruction, always ask your healthcare professional. [place] [address] any warranty or liability for your use of this information.

## 2018-10-16 NOTE — PROGRESS NOTES
Patient is a 52 y.o. female presenting with cold symptoms. Cold Symptoms   The history is provided by the patient. This is a new problem. Episode onset: 1 week ago. The problem occurs constantly. The problem has been gradually worsening. The cough is productive of sputum. There has been no fever. Associated symptoms include headaches and rhinorrhea. Pertinent negatives include no chest pain, no chills, no sweats, no ear congestion, no ear pain, no sore throat, no myalgias, no shortness of breath, no wheezing, no nausea and no vomiting. Associated symptoms comments: Sinus pressure. She has tried decongestants for the symptoms. The treatment provided no relief. She is not a smoker. Past medical history comments: Allergic rhinitis on Allegra, singulair. Past Medical History:   Diagnosis Date    Acid reflux     Asthma     Environmental allergies     Hypercholesteremia 12/21/2015    Hypertension 6/21/2011        Past Surgical History:   Procedure Laterality Date    HX HEENT      wisdom teeth extraction    HX MYOMECTOMY      HX MYOMECTOMY      SINUS SURGERY PROC UNLISTED      x2         Family History   Problem Relation Age of Onset    Heart Disease Mother 68     quadruple bypass and valvular surgery    Hypertension Father     Cancer Father      prostate cancer    Heart Disease Father     Diabetes Father      diet controlled    Hypertension Sister     Other Sister      acid reflux        Social History     Social History    Marital status: SINGLE     Spouse name: N/A    Number of children: N/A    Years of education: N/A     Occupational History    Not on file.      Social History Main Topics    Smoking status: Never Smoker    Smokeless tobacco: Never Used    Alcohol use No    Drug use: No    Sexual activity: Not on file     Other Topics Concern    Not on file     Social History Narrative                ALLERGIES: Sulfa (sulfonamide antibiotics)    Review of Systems   Constitutional: Negative for activity change, appetite change, chills and fever. HENT: Positive for congestion, rhinorrhea, sinus pain and sinus pressure. Negative for ear pain, sore throat and trouble swallowing. Respiratory: Positive for cough. Negative for shortness of breath and wheezing. Cardiovascular: Negative for chest pain and palpitations. Gastrointestinal: Negative for nausea and vomiting. Musculoskeletal: Negative for myalgias. Neurological: Positive for headaches. Negative for dizziness. Hematological: Negative for adenopathy. Vitals:    10/16/18 0919   BP: 142/83   Pulse: 62   Resp: 18   Temp: 97 °F (36.1 °C)   SpO2: 97%   Weight: 205 lb (93 kg)   Height: 5' 3\" (1.6 m)       Physical Exam   Constitutional: She appears well-developed and well-nourished. No distress. HENT:   Right Ear: Tympanic membrane, external ear and ear canal normal.   Left Ear: Tympanic membrane, external ear and ear canal normal.   Nose: Rhinorrhea present. Right sinus exhibits maxillary sinus tenderness. Right sinus exhibits no frontal sinus tenderness. Left sinus exhibits maxillary sinus tenderness. Left sinus exhibits no frontal sinus tenderness. Mouth/Throat: Mucous membranes are normal. Posterior oropharyngeal erythema present. No oropharyngeal exudate, posterior oropharyngeal edema or tonsillar abscesses. Cardiovascular: Normal rate, regular rhythm and normal heart sounds. Pulmonary/Chest: Effort normal and breath sounds normal. No respiratory distress. She has no wheezes. She has no rales. Lymphadenopathy:     She has cervical adenopathy. Neurological: She is alert. Skin: She is not diaphoretic. Psychiatric: She has a normal mood and affect. Her behavior is normal. Judgment and thought content normal.   Nursing note and vitals reviewed. [facility]    ICD-10-CM ICD-9-CM    1.  Sinusitis, unspecified chronicity, unspecified location J32.9 473.9      Medications Ordered Today   Medications    amoxicillin-clavulanate (AUGMENTIN) 875-125 mg per tablet     Sig: Take 1 Tab by mouth two (2) times a day for 10 days. Dispense:  20 Tab     Refill:  0     The patients condition was discussed with the patient and they understand. The patient is to follow up with PCP [facility]. If signs and symptoms become worse the pt is to go to the ER. The patient is to take medications as prescribed.              Procedures

## 2018-11-09 ENCOUNTER — OFFICE VISIT (OUTPATIENT)
Dept: URGENT CARE | Age: 53
End: 2018-11-09

## 2018-11-09 VITALS
BODY MASS INDEX: 37.36 KG/M2 | TEMPERATURE: 98.1 F | DIASTOLIC BLOOD PRESSURE: 88 MMHG | HEART RATE: 62 BPM | RESPIRATION RATE: 18 BRPM | SYSTOLIC BLOOD PRESSURE: 136 MMHG | OXYGEN SATURATION: 98 % | WEIGHT: 203 LBS | HEIGHT: 62 IN

## 2018-11-09 DIAGNOSIS — J32.9 RECURRENT SINUSITIS: Primary | ICD-10-CM

## 2018-11-09 RX ORDER — PREDNISONE 5 MG/1
TABLET ORAL
Qty: 21 TAB | Refills: 0 | Status: SHIPPED | OUTPATIENT
Start: 2018-11-09 | End: 2018-12-19

## 2018-11-09 RX ORDER — AMOXICILLIN AND CLAVULANATE POTASSIUM 875; 125 MG/1; MG/1
1 TABLET, FILM COATED ORAL EVERY 12 HOURS
Qty: 20 TAB | Refills: 0 | Status: SHIPPED | OUTPATIENT
Start: 2018-11-09 | End: 2018-11-19

## 2018-11-09 NOTE — PATIENT INSTRUCTIONS
Follow up with your ENT within next 1-2 weeks, sooner for any new or worsening. Sinusitis: Care Instructions  Your Care Instructions    Sinusitis is an infection of the lining of the sinus cavities in your head. Sinusitis often follows a cold. It causes pain and pressure in your head and face. In most cases, sinusitis gets better on its own in 1 to 2 weeks. But some mild symptoms may last for several weeks. Sometimes antibiotics are needed. Follow-up care is a key part of your treatment and safety. Be sure to make and go to all appointments, and call your doctor if you are having problems. It's also a good idea to know your test results and keep a list of the medicines you take. How can you care for yourself at home? · Take an over-the-counter pain medicine, such as acetaminophen (Tylenol), ibuprofen (Advil, Motrin), or naproxen (Aleve). Read and follow all instructions on the label. · If the doctor prescribed antibiotics, take them as directed. Do not stop taking them just because you feel better. You need to take the full course of antibiotics. · Be careful when taking over-the-counter cold or flu medicines and Tylenol at the same time. Many of these medicines have acetaminophen, which is Tylenol. Read the labels to make sure that you are not taking more than the recommended dose. Too much acetaminophen (Tylenol) can be harmful. · Breathe warm, moist air from a steamy shower, a hot bath, or a sink filled with hot water. Avoid cold, dry air. Using a humidifier in your home may help. Follow the directions for cleaning the machine. · Use saline (saltwater) nasal washes to help keep your nasal passages open and wash out mucus and bacteria. You can buy saline nose drops at a grocery store or drugstore. Or you can make your own at home by adding 1 teaspoon of salt and 1 teaspoon of baking soda to 2 cups of distilled water.  If you make your own, fill a bulb syringe with the solution, insert the tip into your nostril, and squeeze gently. Nain Knights your nose. · Put a hot, wet towel or a warm gel pack on your face 3 or 4 times a day for 5 to 10 minutes each time. · Try a decongestant nasal spray like oxymetazoline (Afrin). Do not use it for more than 3 days in a row. Using it for more than 3 days can make your congestion worse. When should you call for help? Call your doctor now or seek immediate medical care if:    · You have new or worse swelling or redness in your face or around your eyes.     · You have a new or higher fever.    Watch closely for changes in your health, and be sure to contact your doctor if:    · You have new or worse facial pain.     · The mucus from your nose becomes thicker (like pus) or has new blood in it.     · You are not getting better as expected. Where can you learn more? Go to http://georgia-stan.info/. Enter S643 in the search box to learn more about \"Sinusitis: Care Instructions. \"  Current as of: March 28, 2018  Content Version: 11.8  © 9810-5333 [employer]. Care instructions adapted under license by Footmarks (which disclaims liability or warranty for this information). If you have questions about a medical condition or this instruction, always ask your healthcare professional. [address] any warranty or liability for your use of this information.

## 2018-11-09 NOTE — PROGRESS NOTES
Here for return of sinus infection  Symptoms include: thick yellow nasal dishcarge, copious amounts of post nasal drip, R and L sinus pain  Was treated for similar on 10/16/2018 approx 3 weeks ago. Took augmentin with temporary relief. abner has history of polyps and sinus issues sees ENT and usually requires 2 rouds of antibiotics  Has been doing neti pot multiple times per day. Using flonase and anti-histamines without relief. Denies severe headache, nausea, fever or chills  Overall worsening.               Past Medical History:   Diagnosis Date    Acid reflux     Asthma     Environmental allergies     Hypercholesteremia 12/21/2015    Hypertension 6/21/2011        Past Surgical History:   Procedure Laterality Date    HX HEENT      wisdom teeth extraction    HX MYOMECTOMY      HX MYOMECTOMY      SINUS SURGERY PROC UNLISTED      x2         Family History   Problem Relation Age of Onset    Heart Disease Mother 68        quadruple bypass and valvular surgery    Hypertension Father     Cancer Father         prostate cancer    Heart Disease Father     Diabetes Father         diet controlled    Hypertension Sister     Other Sister         acid reflux        Social History     Socioeconomic History    Marital status: SINGLE     Spouse name: Not on file    Number of children: Not on file    Years of education: Not on file    Highest education level: Not on file   Social Needs    Financial resource strain: Not on file    Food insecurity - worry: Not on file    Food insecurity - inability: Not on file   [employer] needs - medical: Not on file   [employer] needs - non-medical: Not on file   Occupational History    Not on file   Tobacco Use    Smoking status: Never Smoker    Smokeless tobacco: Never Used   Substance and Sexual Activity    Alcohol use: No    Drug use: No    Sexual activity: Not on file   Other Topics Concern    Not on file   Social History Narrative    Not on file                ALLERGIES: Sulfa (sulfonamide antibiotics)    Review of Systems   All other systems reviewed and are negative. Vitals:    11/09/18 1430   BP: 136/88   Pulse: 62   Resp: 18   Temp: 98.1 °F (36.7 °C)   SpO2: 98%   Weight: 203 lb (92.1 kg)   Height: 5' 2\" (1.575 m)       Physical Exam   Constitutional: She is oriented to person, place, and time. No distress. Non-toxic appearing, well hydrated   HENT:   Maxillary and frontal sinus tenderness to palpation with decreased nasal patency bilat. TMs dull with small amount of fluid. OP clear and moist.   Eyes: Conjunctivae and EOM are normal. Pupils are equal, round, and reactive to light. No scleral icterus. Neck: Normal range of motion. Neck supple. Cardiovascular: Normal rate, regular rhythm and normal heart sounds. Exam reveals no gallop and no friction rub. No murmur heard. Pulmonary/Chest: Effort normal and breath sounds normal. No respiratory distress. She has no wheezes. She has no rales. Lymphadenopathy:     She has no cervical adenopathy. Neurological: She is alert and oriented to person, place, and time. No cranial nerve deficit. Skin: Skin is warm and dry. No rash noted. She is not diaphoretic. No erythema. No pallor. Psychiatric: She has a normal mood and affect. Her behavior is normal. Thought content normal.   Nursing note and vitals reviewed. [facility]     Differential Diagnosis; Clinical Impression; Plan:       CLINICAL IMPRESSION:  (J32.9) Recurrent sinusitis  (primary encounter diagnosis)    Orders Placed This Encounter      amoxicillin-clavulanate (AUGMENTIN) 875-125 mg per tablet      predniSONE (STERAPRED) 5 mg dose pack      Plan:  1. See above orders. Will Rx prednisone for symptomatic relief. 2. Advised pro biotic with antibiotic. 3. Continue nasal rinse, flonase and anti-histamine  4. Follow up with ENT in 1-2 weeks sooner for new or worsening.       We have reviewed concerning signs/symptoms, normal vs abnormal progression of medical condition and when to seek immediate medical attention. Schedule with PCP or Urgent Care immediately for worsening or new symptoms.         Procedures

## 2018-12-19 ENCOUNTER — OFFICE VISIT (OUTPATIENT)
Dept: URGENT CARE | Age: 53
End: 2018-12-19

## 2018-12-19 VITALS
BODY MASS INDEX: 38.09 KG/M2 | HEIGHT: 62 IN | DIASTOLIC BLOOD PRESSURE: 85 MMHG | HEART RATE: 96 BPM | SYSTOLIC BLOOD PRESSURE: 143 MMHG | WEIGHT: 207 LBS | OXYGEN SATURATION: 99 % | RESPIRATION RATE: 14 BRPM | TEMPERATURE: 97.1 F

## 2018-12-19 DIAGNOSIS — H66.92 ACUTE LEFT OTITIS MEDIA: Primary | ICD-10-CM

## 2018-12-19 RX ORDER — CEFDINIR 300 MG/1
300 CAPSULE ORAL 2 TIMES DAILY
Qty: 20 CAP | Refills: 0 | Status: SHIPPED | OUTPATIENT
Start: 2018-12-19 | End: 2018-12-29

## 2018-12-19 RX ORDER — FLUCONAZOLE 150 MG/1
150 TABLET ORAL
Qty: 1 TAB | Refills: 0 | Status: SHIPPED | OUTPATIENT
Start: 2018-12-19 | End: 2018-12-20

## 2018-12-19 NOTE — PROGRESS NOTES
Ansley Alejandre presents with worsening bilateral ear pain and itching L>R x 1 week. Reports mild cough, PND. Denies SOB, fever, CP, sinus pressure/pain. Has a past medical history of Acid reflux, Asthma, Environmental allergies, Hypercholesteremia, and Hypertension. The history is provided by the patient. Past Medical History:   Diagnosis Date    Acid reflux     Asthma     Environmental allergies     Hypercholesteremia 12/21/2015    Hypertension 6/21/2011        Past Surgical History:   Procedure Laterality Date    HX HEENT      wisdom teeth extraction    HX MYOMECTOMY      HX MYOMECTOMY      SINUS SURGERY PROC UNLISTED      x2         Family History   Problem Relation Age of Onset    Heart Disease Mother 68        quadruple bypass and valvular surgery    Hypertension Father     Cancer Father         prostate cancer    Heart Disease Father     Diabetes Father         diet controlled    Hypertension Sister     Other Sister         acid reflux        Social History     Socioeconomic History    Marital status: SINGLE     Spouse name: Not on file    Number of children: Not on file    Years of education: Not on file    Highest education level: Not on file   Social Needs    Financial resource strain: Not on file    Food insecurity - worry: Not on file    Food insecurity - inability: Not on file   [employer] needs - medical: Not on file   [employer] needs - non-medical: Not on file   Occupational History    Not on file   Tobacco Use    Smoking status: Never Smoker    Smokeless tobacco: Never Used   Substance and Sexual Activity    Alcohol use: No    Drug use: No    Sexual activity: Not on file   Other Topics Concern    Not on file   Social History Narrative    Not on file                ALLERGIES: Sulfa (sulfonamide antibiotics)    Review of Systems   Constitutional: Negative for activity change, appetite change, chills and fever.    HENT: Positive for congestion, ear pain, postnasal drip and rhinorrhea. Negative for sinus pressure, sinus pain, sore throat and trouble swallowing. Respiratory: Positive for cough. Negative for shortness of breath and wheezing. Cardiovascular: Negative for chest pain and palpitations. Gastrointestinal: Negative for nausea and vomiting. Musculoskeletal: Negative for myalgias. Neurological: Negative for dizziness and headaches. Hematological: Positive for adenopathy. Vitals:    12/19/18 1435   BP: 143/85   Pulse: 96   Resp: 14   Temp: 97.1 °F (36.2 °C)   SpO2: 99%   Weight: 207 lb (93.9 kg)   Height: 5' 2\" (1.575 m)       Physical Exam   Constitutional: She appears well-developed and well-nourished. No distress. HENT:   Right Ear: External ear and ear canal normal.   Left Ear: External ear and ear canal normal. Tympanic membrane is erythematous and bulging. A middle ear effusion is present. Nose: Rhinorrhea present. Right sinus exhibits no maxillary sinus tenderness and no frontal sinus tenderness. Left sinus exhibits no maxillary sinus tenderness and no frontal sinus tenderness. Mouth/Throat: Oropharynx is clear and moist and mucous membranes are normal. No oropharyngeal exudate, posterior oropharyngeal edema, posterior oropharyngeal erythema or tonsillar abscesses. R-TM: opacified   Cardiovascular: Normal rate, regular rhythm and normal heart sounds. Pulmonary/Chest: Effort normal and breath sounds normal. No respiratory distress. She has no wheezes. She has no rales. Lymphadenopathy:     She has cervical adenopathy. Neurological: She is alert. Skin: She is not diaphoretic. Psychiatric: She has a normal mood and affect. Her behavior is normal. Judgment and thought content normal.   Nursing note and vitals reviewed. [facility]    ICD-10-CM ICD-9-CM    1.  Acute left otitis media H66.92 382.9      Medications Ordered Today   Medications    cefdinir (OMNICEF) 300 mg capsule     Sig: Take 1 Cap by mouth two (2) times a day for 10 days. Dispense:  20 Cap     Refill:  0    fluconazole (DIFLUCAN) 150 mg tablet     Sig: Take 1 Tab by mouth daily as needed for up to 1 day. Dispense:  1 Tab     Refill:  0     The patients condition was discussed with the patient and they understand. The patient is to follow up with PCP. If signs and symptoms become worse the pt is to go to the ER. The patient is to take medications as prescribed.              Procedures

## 2018-12-19 NOTE — PATIENT INSTRUCTIONS

## 2019-02-19 ENCOUNTER — HOSPITAL ENCOUNTER (OUTPATIENT)
Dept: MAMMOGRAPHY | Age: 54
Discharge: HOME | End: 2019-02-19
Attending: FAMILY MEDICINE
Payer: COMMERCIAL

## 2019-02-19 DIAGNOSIS — Z12.39 SCREENING BREAST EXAMINATION: ICD-10-CM

## 2019-02-19 PROCEDURE — 77067 SCR MAMMO BI INCL CAD: CPT

## 2019-07-24 ENCOUNTER — OFFICE VISIT (OUTPATIENT)
Dept: URGENT CARE | Age: 54
End: 2019-07-24

## 2019-07-24 VITALS
OXYGEN SATURATION: 96 % | TEMPERATURE: 98.6 F | WEIGHT: 203 LBS | SYSTOLIC BLOOD PRESSURE: 132 MMHG | DIASTOLIC BLOOD PRESSURE: 82 MMHG | RESPIRATION RATE: 16 BRPM | HEART RATE: 78 BPM | BODY MASS INDEX: 35.97 KG/M2 | HEIGHT: 63 IN

## 2019-07-24 DIAGNOSIS — R09.81 SINUS CONGESTION: Primary | ICD-10-CM

## 2019-07-24 DIAGNOSIS — J06.9 VIRAL UPPER RESPIRATORY TRACT INFECTION: ICD-10-CM

## 2019-07-24 PROBLEM — E66.01 SEVERE OBESITY (HCC): Status: ACTIVE | Noted: 2019-07-24

## 2019-07-24 RX ORDER — PREDNISONE 10 MG/1
TABLET ORAL
Qty: 21 TAB | Refills: 0 | Status: SHIPPED | OUTPATIENT
Start: 2019-07-24 | End: 2019-10-15

## 2019-07-24 RX ORDER — AMOXICILLIN 875 MG/1
875 TABLET, FILM COATED ORAL 2 TIMES DAILY
Qty: 20 TAB | Refills: 0 | Status: SHIPPED | OUTPATIENT
Start: 2019-07-24 | End: 2019-08-03

## 2019-07-24 NOTE — PROGRESS NOTES
Sinus Pain   This is a new problem. The current episode started more than 1 week ago. The problem occurs constantly. The problem has not changed since onset. Associated symptoms include headaches. Nothing aggravates the symptoms. Nothing relieves the symptoms. She has tried acetaminophen (zyrtec/ flonase) for the symptoms. The treatment provided mild relief.         Past Medical History:   Diagnosis Date    Acid reflux     Asthma     Environmental allergies     Hypercholesteremia 12/21/2015    Hypertension 6/21/2011        Past Surgical History:   Procedure Laterality Date    HX HEENT      wisdom teeth extraction    HX MYOMECTOMY      HX MYOMECTOMY      SINUS SURGERY PROC UNLISTED      x2         Family History   Problem Relation Age of Onset    Heart Disease Mother 68        quadruple bypass and valvular surgery    Hypertension Father     Cancer Father         prostate cancer    Heart Disease Father     Diabetes Father         diet controlled    Hypertension Sister     Other Sister         acid reflux        Social History     Socioeconomic History    Marital status: SINGLE     Spouse name: Not on file    Number of children: Not on file    Years of education: Not on file    Highest education level: Not on file   Occupational History    Not on file   Social Needs    Financial resource strain: Not on file    Food insecurity:     Worry: Not on file     Inability: Not on file    Transportation needs:     Medical: Not on file     Non-medical: Not on file   Tobacco Use    Smoking status: Never Smoker    Smokeless tobacco: Never Used   Substance and Sexual Activity    Alcohol use: No    Drug use: No    Sexual activity: Not on file   Lifestyle    Physical activity:     Days per week: Not on file     Minutes per session: Not on file    Stress: Not on file   Relationships    Social connections:     Talks on phone: Not on file     Gets together: Not on file     Attends Mosque service: Not on file     Active member of club or organization: Not on file     Attends meetings of clubs or organizations: Not on file     Relationship status: Not on file    Intimate partner violence:     Fear of current or ex partner: Not on file     Emotionally abused: Not on file     Physically abused: Not on file     Forced sexual activity: Not on file   Other Topics Concern    Not on file   Social History Narrative    Not on file                ALLERGIES: Sulfa (sulfonamide antibiotics)    Review of Systems   HENT: Positive for congestion, postnasal drip, sinus pressure and sinus pain. Neurological: Positive for headaches. All other systems reviewed and are negative. Vitals:    07/24/19 1339   BP: 132/82   Pulse: 78   Resp: 16   Temp: 98.6 °F (37 °C)   SpO2: 96%   Weight: 203 lb (92.1 kg)   Height: 5' 3\" (1.6 m)       Physical Exam   Constitutional: No distress. HENT:   Right Ear: Tympanic membrane and ear canal normal.   Left Ear: Tympanic membrane and ear canal normal.   Nose: Nose normal.   Mouth/Throat: No oropharyngeal exudate, posterior oropharyngeal edema or posterior oropharyngeal erythema. Eyes: Conjunctivae are normal. Right eye exhibits no discharge. Left eye exhibits no discharge. Neck: Neck supple. Pulmonary/Chest: Effort normal and breath sounds normal. No respiratory distress. She has no wheezes. She has no rales. Lymphadenopathy:     She has no cervical adenopathy. Skin: No rash noted. Nursing note and vitals reviewed. MDM    Procedures      ICD-10-CM ICD-9-CM    1. Sinus congestion R09.81 478.19    2. Viral upper respiratory tract infection J06.9 465.9      Medications Ordered Today   Medications    amoxicillin (AMOXIL) 875 mg tablet     Sig: Take 1 Tab by mouth two (2) times a day for 10 days.      Dispense:  20 Tab     Refill:  0    predniSONE (STERAPRED DS) 10 mg dose pack     Sig: As directed     Dispense:  21 Tab     Refill:  0     No results found for any visits on 07/24/19. The patients condition was discussed with the patient and they understand. The patient is to follow up with primary care doctor. If signs and symptoms become worse the pt is to go to the ER. The patient is to take medications as prescribed.

## 2019-07-24 NOTE — PATIENT INSTRUCTIONS

## 2019-10-15 ENCOUNTER — OFFICE VISIT (OUTPATIENT)
Dept: URGENT CARE | Age: 54
End: 2019-10-15

## 2019-10-15 VITALS
BODY MASS INDEX: 35.97 KG/M2 | WEIGHT: 203 LBS | SYSTOLIC BLOOD PRESSURE: 134 MMHG | TEMPERATURE: 97.9 F | HEIGHT: 63 IN | RESPIRATION RATE: 18 BRPM | HEART RATE: 64 BPM | DIASTOLIC BLOOD PRESSURE: 67 MMHG | OXYGEN SATURATION: 98 %

## 2019-10-15 DIAGNOSIS — H60.393 OTHER INFECTIVE ACUTE OTITIS EXTERNA OF BOTH EARS: Primary | ICD-10-CM

## 2019-10-15 RX ORDER — NEOMYCIN SULFATE, POLYMYXIN B SULFATE, HYDROCORTISONE 3.5; 10000; 1 MG/ML; [USP'U]/ML; MG/ML
4 SOLUTION/ DROPS AURICULAR (OTIC) 4 TIMES DAILY
Qty: 10 ML | Refills: 0 | Status: SHIPPED | OUTPATIENT
Start: 2019-10-15 | End: 2019-10-22

## 2019-10-15 NOTE — PROGRESS NOTES
Nataly Grande presents with persistent bilateral ear canal itching R>L for the past 2 weeks associated with ear drainage/flaking. Denies pain, fever, cough, congestion, runny nose, ST. The history is provided by the patient.         Past Medical History:   Diagnosis Date    Acid reflux     Asthma     Environmental allergies     Hypercholesteremia 12/21/2015    Hypertension 6/21/2011        Past Surgical History:   Procedure Laterality Date    HX HEENT      wisdom teeth extraction    HX MYOMECTOMY      HX MYOMECTOMY      SINUS SURGERY PROC UNLISTED      x2         Family History   Problem Relation Age of Onset    Heart Disease Mother 68        quadruple bypass and valvular surgery    Hypertension Father     Cancer Father         prostate cancer    Heart Disease Father     Diabetes Father         diet controlled    Hypertension Sister     Other Sister         acid reflux        Social History     Socioeconomic History    Marital status: SINGLE     Spouse name: Not on file    Number of children: Not on file    Years of education: Not on file    Highest education level: Not on file   Occupational History    Not on file   Social Needs    Financial resource strain: Not on file    Food insecurity:     Worry: Not on file     Inability: Not on file    Transportation needs:     Medical: Not on file     Non-medical: Not on file   Tobacco Use    Smoking status: Never Smoker    Smokeless tobacco: Never Used   Substance and Sexual Activity    Alcohol use: No    Drug use: No    Sexual activity: Not on file   Lifestyle    Physical activity:     Days per week: Not on file     Minutes per session: Not on file    Stress: Not on file   Relationships    Social connections:     Talks on phone: Not on file     Gets together: Not on file     Attends Islam service: Not on file     Active member of club or organization: Not on file     Attends meetings of clubs or organizations: Not on file     Relationship status: Not on file    Intimate partner violence:     Fear of current or ex partner: Not on file     Emotionally abused: Not on file     Physically abused: Not on file     Forced sexual activity: Not on file   Other Topics Concern    Not on file   Social History Narrative    Not on file                ALLERGIES: Sulfa (sulfonamide antibiotics)    Review of Systems   Constitutional: Negative for activity change, appetite change, chills and fever. HENT: Positive for ear discharge. Negative for congestion, ear pain, rhinorrhea, sinus pressure, sinus pain, sore throat and trouble swallowing. Respiratory: Negative for cough, shortness of breath and wheezing. Cardiovascular: Negative for chest pain and palpitations. Gastrointestinal: Negative for nausea and vomiting. Musculoskeletal: Negative for myalgias. Neurological: Negative for dizziness and headaches. Hematological: Negative for adenopathy. Vitals:    10/15/19 1210   BP: 134/67   Pulse: 64   Resp: 18   Temp: 97.9 °F (36.6 °C)   SpO2: 98%   Weight: 203 lb (92.1 kg)   Height: 5' 3\" (1.6 m)       Physical Exam   Constitutional: She appears well-developed and well-nourished. No distress. HENT:   Right Ear: Tympanic membrane normal. There is drainage (purulent) and tenderness. Left Ear: Tympanic membrane normal. There is drainage and tenderness. Neurological: She is alert. Skin: She is not diaphoretic. Psychiatric: She has a normal mood and affect. Her behavior is normal. Judgment and thought content normal.   Nursing note and vitals reviewed. MDM    ICD-10-CM ICD-9-CM   1. Other infective acute otitis externa of both ears H60.393 380.10     Medications Ordered Today   Medications    neomycin-polymyxin-hydrocortisone (CORTISPORIN) otic solution     Sig: Administer 4 Drops into each ear four (4) times daily for 7 days. Dispense:  10 mL     Refill:  0     The patients condition was discussed with the patient and they understand. The patient is to follow up with PCP. If signs and symptoms become worse the pt is to go to the ER. The patient is to take medications as prescribed. AVS given with patient instructions.             Procedures

## 2019-10-15 NOTE — PATIENT INSTRUCTIONS

## 2020-01-12 ENCOUNTER — OFFICE VISIT (OUTPATIENT)
Dept: URGENT CARE | Age: 55
End: 2020-01-12

## 2020-01-12 VITALS
HEART RATE: 78 BPM | DIASTOLIC BLOOD PRESSURE: 84 MMHG | BODY MASS INDEX: 35.97 KG/M2 | TEMPERATURE: 98.4 F | SYSTOLIC BLOOD PRESSURE: 130 MMHG | WEIGHT: 203 LBS | OXYGEN SATURATION: 96 % | HEIGHT: 63 IN | RESPIRATION RATE: 18 BRPM

## 2020-01-12 DIAGNOSIS — J01.00 ACUTE NON-RECURRENT MAXILLARY SINUSITIS: Primary | ICD-10-CM

## 2020-01-12 RX ORDER — AMOXICILLIN AND CLAVULANATE POTASSIUM 875; 125 MG/1; MG/1
1 TABLET, FILM COATED ORAL 2 TIMES DAILY
Qty: 20 TAB | Refills: 0 | Status: SHIPPED | OUTPATIENT
Start: 2020-01-12 | End: 2020-01-22

## 2020-01-12 RX ORDER — METHYLPREDNISOLONE 4 MG/1
TABLET ORAL
Qty: 1 DOSE PACK | Refills: 0 | Status: SHIPPED | OUTPATIENT
Start: 2020-01-12 | End: 2021-03-16

## 2020-01-12 NOTE — PATIENT INSTRUCTIONS
Rest and seek medical care for increased problems, any questions or concern including but not limited to the ones discussed with you here today. Drink plenty of fluids, use saline nasal spray, muccinex as directed. Sinusitis: Care Instructions  Your Care Instructions    Sinusitis is an infection of the lining of the sinus cavities in your head. Sinusitis often follows a cold. It causes pain and pressure in your head and face. In most cases, sinusitis gets better on its own in 1 to 2 weeks. But some mild symptoms may last for several weeks. Sometimes antibiotics are needed. Follow-up care is a key part of your treatment and safety. Be sure to make and go to all appointments, and call your doctor if you are having problems. It's also a good idea to know your test results and keep a list of the medicines you take. How can you care for yourself at home? · Take an over-the-counter pain medicine, such as acetaminophen (Tylenol), ibuprofen (Advil, Motrin), or naproxen (Aleve). Read and follow all instructions on the label. · If the doctor prescribed antibiotics, take them as directed. Do not stop taking them just because you feel better. You need to take the full course of antibiotics. · Be careful when taking over-the-counter cold or flu medicines and Tylenol at the same time. Many of these medicines have acetaminophen, which is Tylenol. Read the labels to make sure that you are not taking more than the recommended dose. Too much acetaminophen (Tylenol) can be harmful. · Breathe warm, moist air from a steamy shower, a hot bath, or a sink filled with hot water. Avoid cold, dry air. Using a humidifier in your home may help. Follow the directions for cleaning the machine. · Use saline (saltwater) nasal washes to help keep your nasal passages open and wash out mucus and bacteria. You can buy saline nose drops at a grocery store or drugstore.  Or you can make your own at home by adding 1 teaspoon of salt and 1 teaspoon of baking soda to 2 cups of distilled water. If you make your own, fill a bulb syringe with the solution, insert the tip into your nostril, and squeeze gently. Marhennya Martin your nose. · Put a hot, wet towel or a warm gel pack on your face 3 or 4 times a day for 5 to 10 minutes each time. · Try a decongestant nasal spray like oxymetazoline (Afrin). Do not use it for more than 3 days in a row. Using it for more than 3 days can make your congestion worse. When should you call for help? Call your doctor now or seek immediate medical care if:    · You have new or worse swelling or redness in your face or around your eyes.     · You have a new or higher fever.    Watch closely for changes in your health, and be sure to contact your doctor if:    · You have new or worse facial pain.     · The mucus from your nose becomes thicker (like pus) or has new blood in it.     · You are not getting better as expected. Where can you learn more? Go to http://[place]-stan.info/. Enter H350 in the search box to learn more about \"Sinusitis: Care Instructions. \"  Current as of: October 21, 2018  Content Version: 12.2  © 4493-3700 [employer]. Care instructions adapted under license by [employer] (which disclaims liability or warranty for this information). If you have questions about a medical condition or this instruction, always ask your healthcare professional. [place] [address] any warranty or liability for your use of this information.

## 2020-01-12 NOTE — PROGRESS NOTES
2 weeks of some congestion and dry occasional cough and some sinus pressure HA and eyes watering. Trying OTC and supportive care without relief and increasing sinus pressure    The history is provided by the patient.         Past Medical History:   Diagnosis Date    Acid reflux     Asthma     Environmental allergies     Hypercholesteremia 12/21/2015    Hypertension 6/21/2011        Past Surgical History:   Procedure Laterality Date    HX HEENT      wisdom teeth extraction    HX MYOMECTOMY      HX MYOMECTOMY      SINUS SURGERY PROC UNLISTED      x2         Family History   Problem Relation Age of Onset    Heart Disease Mother 68        quadruple bypass and valvular surgery    Hypertension Father     Cancer Father         prostate cancer    Heart Disease Father     Diabetes Father         diet controlled    Hypertension Sister     Other Sister         acid reflux        Social History     Socioeconomic History    Marital status: SINGLE     Spouse name: Not on file    Number of children: Not on file    Years of education: Not on file    Highest education level: Not on file   Occupational History    Not on file   Social Needs    Financial resource strain: Not on file    Food insecurity:     Worry: Not on file     Inability: Not on file    Transportation needs:     Medical: Not on file     Non-medical: Not on file   Tobacco Use    Smoking status: Never Smoker    Smokeless tobacco: Never Used   Substance and Sexual Activity    Alcohol use: No    Drug use: No    Sexual activity: Not on file   Lifestyle    Physical activity:     Days per week: Not on file     Minutes per session: Not on file    Stress: Not on file   Relationships    Social connections:     Talks on phone: Not on file     Gets together: Not on file     Attends Latter-day service: Not on file     Active member of club or organization: Not on file     Attends meetings of clubs or organizations: Not on file     Relationship status: Not on file    Intimate partner violence:     Fear of current or ex partner: Not on file     Emotionally abused: Not on file     Physically abused: Not on file     Forced sexual activity: Not on file   Other Topics Concern    Not on file   Social History Narrative    Not on file                ALLERGIES: Sulfa (sulfonamide antibiotics)    Review of Systems   Constitutional: Negative. Negative for fever. HENT: Positive for congestion, postnasal drip, sinus pressure and voice change (\"a little frog in her throat sometimes\"). Negative for sore throat and tinnitus. Eyes:        Eyes are watering   Respiratory: Positive for cough. Negative for choking, chest tightness, shortness of breath and wheezing. Cardiovascular: Negative. Gastrointestinal: Negative for diarrhea, nausea and vomiting. Musculoskeletal: Negative. Skin: Negative. Neurological: Positive for headaches (sinus pressure HA). Hematological: Negative. Psychiatric/Behavioral: Negative. Vitals:    01/12/20 0934   BP: 144/73   Pulse: 78   Resp: 18   Temp: 98.4 °F (36.9 °C)   SpO2: 96%   Weight: 203 lb (92.1 kg)   Height: 5' 3\" (1.6 m)       Physical Exam  Vitals signs and nursing note reviewed. Constitutional:       General: She is not in acute distress. Appearance: She is well-developed. She is obese. She is not ill-appearing or diaphoretic. HENT:      Head: Normocephalic and atraumatic. Right Ear: External ear normal.      Left Ear: External ear normal.      Ears:      Comments: Nasal congestion, No sinus pain-central maxillary sinus pressure, PND, BL fluid behind the TM's without bulging. Mild erythema rt TM, no pain, no dullness       Nose: Congestion present. Comments: Purulent discharge     Mouth/Throat:      Pharynx: No oropharyngeal exudate. Comments: Voice clear  Eyes:      General: No scleral icterus. Right eye: No discharge. Left eye: No discharge.       Conjunctiva/sclera: Conjunctivae normal.      Pupils: Pupils are equal, round, and reactive to light. Neck:      Musculoskeletal: Normal range of motion. No neck rigidity. Thyroid: No thyromegaly. Trachea: No tracheal deviation. Cardiovascular:      Rate and Rhythm: Normal rate and regular rhythm. Heart sounds: Normal heart sounds. No murmur. Pulmonary:      Effort: Pulmonary effort is normal. No respiratory distress. Breath sounds: Normal breath sounds. No wheezing or rales. Comments: Rare, isolated dry coughs  Chest:      Chest wall: No tenderness. Abdominal:      General: Bowel sounds are normal. There is no distension. Tenderness: There is no tenderness. There is no guarding or rebound. Lymphadenopathy:      Cervical: No cervical adenopathy. Skin:     General: Skin is warm. Findings: No erythema or rash. Neurological:      General: No focal deficit present. Mental Status: She is alert and oriented to person, place, and time. Cranial Nerves: No cranial nerve deficit. Coordination: Coordination normal.      Comments: Ambulatory with clear speech   Psychiatric:         Behavior: Behavior normal.         Thought Content: Thought content normal.         Judgment: Judgment normal.         [facility]    ICD-10-CM ICD-9-CM    1. Acute non-recurrent maxillary sinusitis J01.00 461.0      Medications Ordered Today   Medications    amoxicillin-clavulanate (AUGMENTIN) 875-125 mg per tablet     Sig: Take 1 Tab by mouth two (2) times a day for 10 days. Dispense:  20 Tab     Refill:  0    methylPREDNISolone (MEDROL DOSEPACK) 4 mg tablet     Sig: As directed     Dispense:  1 Dose Pack     Refill:  0     The patients condition was discussed with the patient and they understand. The patient is to follow up with primary care doctor ,If signs and symptoms become worse the pt is to go to the ER. The patient is to take medications as prescribed.                Procedures

## 2021-03-16 ENCOUNTER — OFFICE VISIT (OUTPATIENT)
Dept: URGENT CARE | Age: 56
End: 2021-03-16
Payer: COMMERCIAL

## 2021-03-16 VITALS — TEMPERATURE: 98.3 F | RESPIRATION RATE: 16 BRPM | OXYGEN SATURATION: 99 % | HEART RATE: 67 BPM

## 2021-03-16 DIAGNOSIS — H60.92 OTITIS EXTERNA OF LEFT EAR, UNSPECIFIED CHRONICITY, UNSPECIFIED TYPE: Primary | ICD-10-CM

## 2021-03-16 PROCEDURE — 99213 OFFICE O/P EST LOW 20 MIN: CPT | Performed by: NURSE PRACTITIONER

## 2021-03-16 RX ORDER — OFLOXACIN 3 MG/ML
10 SOLUTION AURICULAR (OTIC) DAILY
Qty: 5 ML | Refills: 0 | Status: SHIPPED | OUTPATIENT
Start: 2021-03-16 | End: 2021-03-23

## 2021-03-16 NOTE — PROGRESS NOTES
Subjective: (As above and below)       This patient was seen in [facility] at [address] [place] [address] Urgent Care while outdoors at their vehicle due to COVID-19 pandemic with PPE and focused examination in order to decrease community viral transmission. The patient/guardian gave verbal consent to treat. Chief Complaint   Patient presents with    Itching     both ears for 2 days after washing hair     Marya Spencer is a 60 y.o. female who presents for ear issue  Reports got water in her left ear a few days ago that she couldn't get out. It initially felt itchy but now more sore. Left ear only. No fever, chills or discharge  Did try some Aquaphor that didn't seem to help  Tried zyrtec for initial itching but didn't help. Overall worsening. No other associated symptoms  Has ENT that she has seen in past for similar    Review of Systems - negative except as listed above    Reviewed PmHx, RxHx, FmHx, SocHx, AllgHx and updated in chart.   Family History   Problem Relation Age of Onset    Heart Disease Mother 68        quadruple bypass and valvular surgery    Hypertension Father     Cancer Father         prostate cancer    Heart Disease Father     Diabetes Father         diet controlled    Hypertension Sister     Other Sister         acid reflux       Past Medical History:   Diagnosis Date    Acid reflux     Asthma     Environmental allergies     Hypercholesteremia 12/21/2015    Hypertension 6/21/2011      Social History     Socioeconomic History    Marital status: SINGLE     Spouse name: Not on file    Number of children: Not on file    Years of education: Not on file    Highest education level: Not on file   Tobacco Use    Smoking status: Never Smoker    Smokeless tobacco: Never Used   Substance and Sexual Activity    Alcohol use: No    Drug use: No          Current Outpatient Medications   Medication Sig    ofloxacin (FLOXIN) 0.3 % otic solution Administer 10 Drops in left ear daily for 7 days.  fexofenadine (ALLEGRA) 180 mg tablet Take  by mouth.  FLOVENT  mcg/actuation inhaler INHALE 2 PUFFS BY MOUTH TWICE DAILY AS DIRECTED    felodipine (PLENDIL SR) 5 mg 24 hr tablet TAKE ONE TABLET BY MOUTH EVERY DAY    simvastatin (ZOCOR) 20 mg tablet TAKE ONE TABLET BY MOUTH EVERY EVENING. GENERIC FOR ZOCOR    montelukast (SINGULAIR) 10 mg tablet TAKE ONE TABLET BY MOUTH EVERY DAY. GENERIC FOR SINGULAIR    triamterene-hydroCHLOROthiazide (MAXZIDE) 37.5-25 mg per tablet TAKE 1 TABLET BY MOUTH DAILY    albuterol (PROVENTIL HFA, VENTOLIN HFA, PROAIR HFA) 90 mcg/actuation inhaler Take 2 Puffs by inhalation every four (4) hours as needed for Wheezing.  albuterol (PROVENTIL VENTOLIN) 2.5 mg /3 mL (0.083 %) nebulizer solution 3 mL by Nebulization route every four (4) hours as needed for Wheezing. No current facility-administered medications for this visit. Objective:     Vitals:    03/16/21 1021   Pulse: 67   Resp: 16   Temp: 98.3 °F (36.8 °C)   SpO2: 99%       Physical Exam  General appearance  appears well hydrated and does not appear toxic, no acute distress  Eyes - EOMs intact. Non injected. No scleral icterus   Ears - no external swelling, lesion or erythema canals bilat. TMs normal bilat. Nose -  No purulent drainage  Mouth - OP clear without swelling, exudate or lesion. Mucus membranes moist. Uvula midline. Neck/Lymphatics  trachea midline, full AROM  Chest - Normal breathing effort no wheeze rales, rhonchi or diminishments bilaterally. Heart - RRR, no murmurs  Skin - no observable rashes or pallor  Neurologic- alert and oriented x 3  Psychiatric- normal mood, behavior and though content. Assessment/ Plan:     1. Otitis externa of left ear, unspecified chronicity, unspecified type    - ofloxacin (FLOXIN) 0.3 % otic solution; Administer 10 Drops in left ear daily for 7 days.   Dispense: 5 mL; Refill: 0      Although exam unremarkable given history of water in ear with itching leading to more pain and soreness will start on treatment for presumed early otitis externa- ofloxacin otic  Tylenol for discomfort  Keep ear dry  See ENT for worsening or non improvement over next 1 week        Benita Archuleta, NP

## 2022-03-20 PROBLEM — E66.01 SEVERE OBESITY (HCC): Status: ACTIVE | Noted: 2019-07-24

## 2022-12-30 LAB — MAMMOGRAPHY, EXTERNAL: NORMAL

## 2023-05-10 RX ORDER — ALBUTEROL SULFATE 90 UG/1
2 AEROSOL, METERED RESPIRATORY (INHALATION) EVERY 4 HOURS PRN
COMMUNITY
Start: 2016-11-04

## 2023-05-10 RX ORDER — FLUTICASONE PROPIONATE 220 UG/1
AEROSOL, METERED RESPIRATORY (INHALATION)
COMMUNITY
Start: 2017-09-25

## 2023-05-10 RX ORDER — TRIAMTERENE AND HYDROCHLOROTHIAZIDE 37.5; 25 MG/1; MG/1
1 TABLET ORAL DAILY
COMMUNITY
Start: 2017-07-07

## 2023-05-10 RX ORDER — SIMVASTATIN 20 MG
TABLET ORAL
COMMUNITY
Start: 2017-07-24

## 2023-05-10 RX ORDER — MONTELUKAST SODIUM 10 MG/1
TABLET ORAL
COMMUNITY
Start: 2017-07-24

## 2023-05-10 RX ORDER — FELODIPINE 5 MG/1
1 TABLET, EXTENDED RELEASE ORAL DAILY
COMMUNITY
Start: 2017-09-17

## 2023-05-10 RX ORDER — FEXOFENADINE HCL 180 MG/1
TABLET ORAL
COMMUNITY

## 2023-05-10 RX ORDER — ALBUTEROL SULFATE 2.5 MG/3ML
SOLUTION RESPIRATORY (INHALATION) EVERY 4 HOURS PRN
COMMUNITY
Start: 2016-11-04

## 2023-08-30 ENCOUNTER — OFFICE VISIT (OUTPATIENT)
Age: 58
End: 2023-08-30

## 2023-08-30 VITALS
DIASTOLIC BLOOD PRESSURE: 72 MMHG | HEART RATE: 66 BPM | RESPIRATION RATE: 18 BRPM | SYSTOLIC BLOOD PRESSURE: 132 MMHG | TEMPERATURE: 98.3 F | OXYGEN SATURATION: 96 % | WEIGHT: 196 LBS

## 2023-08-30 DIAGNOSIS — M26.622 ARTHRALGIA OF LEFT TEMPOROMANDIBULAR JOINT: Primary | ICD-10-CM

## 2023-08-30 PROBLEM — D25.9 UTERINE LEIOMYOMA: Status: ACTIVE | Noted: 2019-05-10

## 2023-08-30 PROBLEM — K64.9 HEMORRHOIDS: Status: ACTIVE | Noted: 2019-05-10

## 2023-08-30 RX ORDER — CETIRIZINE HYDROCHLORIDE 10 MG/1
1 TABLET ORAL
COMMUNITY

## 2023-08-30 RX ORDER — SEMAGLUTIDE 0.25 MG/.5ML
INJECTION, SOLUTION SUBCUTANEOUS
COMMUNITY
Start: 2023-07-03

## 2023-08-30 RX ORDER — AZELASTINE HYDROCHLORIDE, FLUTICASONE PROPIONATE 137; 50 UG/1; UG/1
SPRAY, METERED NASAL
COMMUNITY

## 2023-08-30 RX ORDER — NAPROXEN 375 MG/1
375 TABLET ORAL 2 TIMES DAILY PRN
Qty: 30 TABLET | Refills: 0 | Status: SHIPPED | OUTPATIENT
Start: 2023-08-30

## 2023-08-30 RX ORDER — FLUTICASONE PROPIONATE 50 MCG
SPRAY, SUSPENSION (ML) NASAL
COMMUNITY
Start: 2023-07-08

## 2023-08-30 RX ORDER — MONTELUKAST SODIUM 10 MG/1
TABLET ORAL
COMMUNITY

## 2023-08-30 NOTE — PROGRESS NOTES
Subjective: (As above and below)     The patient/guardian gave verbal consent to treat. Chief Complaint   Patient presents with    Otalgia     Patient presents for (l) ear pain onset 1 day with no relief. Caitlin Lincoln is a 59 y.o. female who presents for evaluation of : left jaw pain. Symptom onset when she woke up yesterday after using new oral C pap device. Wants to make sure not ear infection. Pain worse with opening jaw. No clicking, poping , numbness, swelling or facial swelling, chest pain. Overall unchanged. No other associated symptoms. Review of Systems    Review of Systems - negative except as listed above    Reviewed PmHx, RxHx, FmHx, SocHx, AllgHx and updated in chart. Family History   Problem Relation Age of Onset    Cancer Father         prostate cancer    Other Sister         acid reflux    Hypertension Sister     Diabetes Father         diet controlled    Heart Disease Father     Heart Disease Mother 68        quadruple bypass and valvular surgery    Hypertension Father        Past Medical History:   Diagnosis Date    Acid reflux     Asthma     Environmental allergies     Hypercholesteremia 12/21/2015    Hypertension 6/21/2011      Social History     Socioeconomic History    Marital status: Single     Spouse name: None    Number of children: None    Years of education: None    Highest education level: None   Tobacco Use    Smoking status: Never     Passive exposure: Never    Smokeless tobacco: Never   Vaping Use    Vaping Use: Never used   Substance and Sexual Activity    Alcohol use: No    Drug use: No          Current Outpatient Medications   Medication Sig    montelukast (SINGULAIR) 10 MG tablet TAKE ONE TABLET BY MOUTH EVERY DAY.  GENERIC FOR SINGULAIR    cetirizine (ZYRTEC) 10 MG tablet Take 1 tablet by mouth Every Day    Azelastine-Fluticasone 137-50 MCG/ACT SUSP SHAKE LIQUID AND USE 1 SPRAY IN EACH NOSTRIL TWICE DAILY Nasal    fluticasone (FLONASE) 50 MCG/ACT nasal

## 2024-06-17 SDOH — HEALTH STABILITY: PHYSICAL HEALTH: ON AVERAGE, HOW MANY MINUTES DO YOU ENGAGE IN EXERCISE AT THIS LEVEL?: 30 MIN

## 2024-06-17 SDOH — HEALTH STABILITY: PHYSICAL HEALTH: ON AVERAGE, HOW MANY DAYS PER WEEK DO YOU ENGAGE IN MODERATE TO STRENUOUS EXERCISE (LIKE A BRISK WALK)?: 2 DAYS

## 2024-06-18 ENCOUNTER — OFFICE VISIT (OUTPATIENT)
Age: 59
End: 2024-06-18
Payer: COMMERCIAL

## 2024-06-18 VITALS
TEMPERATURE: 98.3 F | HEIGHT: 63 IN | WEIGHT: 181 LBS | RESPIRATION RATE: 18 BRPM | OXYGEN SATURATION: 97 % | SYSTOLIC BLOOD PRESSURE: 103 MMHG | HEART RATE: 63 BPM | BODY MASS INDEX: 32.07 KG/M2 | DIASTOLIC BLOOD PRESSURE: 66 MMHG

## 2024-06-18 DIAGNOSIS — I10 ESSENTIAL HYPERTENSION: Primary | ICD-10-CM

## 2024-06-18 DIAGNOSIS — M79.641 BILATERAL HAND PAIN: ICD-10-CM

## 2024-06-18 DIAGNOSIS — M79.642 BILATERAL HAND PAIN: ICD-10-CM

## 2024-06-18 DIAGNOSIS — E78.00 HYPERCHOLESTEREMIA: ICD-10-CM

## 2024-06-18 PROCEDURE — 3078F DIAST BP <80 MM HG: CPT | Performed by: FAMILY MEDICINE

## 2024-06-18 PROCEDURE — 3074F SYST BP LT 130 MM HG: CPT | Performed by: FAMILY MEDICINE

## 2024-06-18 PROCEDURE — 99204 OFFICE O/P NEW MOD 45 MIN: CPT | Performed by: FAMILY MEDICINE

## 2024-06-18 RX ORDER — SEMAGLUTIDE 1.7 MG/.75ML
INJECTION, SOLUTION SUBCUTANEOUS
COMMUNITY
Start: 2023-10-01

## 2024-06-18 RX ORDER — SODIUM, POTASSIUM,MAG SULFATES 17.5-3.13G
SOLUTION, RECONSTITUTED, ORAL ORAL
COMMUNITY
Start: 2024-05-15

## 2024-06-18 SDOH — ECONOMIC STABILITY: FOOD INSECURITY: WITHIN THE PAST 12 MONTHS, THE FOOD YOU BOUGHT JUST DIDN'T LAST AND YOU DIDN'T HAVE MONEY TO GET MORE.: NEVER TRUE

## 2024-06-18 SDOH — ECONOMIC STABILITY: HOUSING INSECURITY
IN THE LAST 12 MONTHS, WAS THERE A TIME WHEN YOU DID NOT HAVE A STEADY PLACE TO SLEEP OR SLEPT IN A SHELTER (INCLUDING NOW)?: NO

## 2024-06-18 SDOH — ECONOMIC STABILITY: FOOD INSECURITY: WITHIN THE PAST 12 MONTHS, YOU WORRIED THAT YOUR FOOD WOULD RUN OUT BEFORE YOU GOT MONEY TO BUY MORE.: NEVER TRUE

## 2024-06-18 SDOH — ECONOMIC STABILITY: INCOME INSECURITY: HOW HARD IS IT FOR YOU TO PAY FOR THE VERY BASICS LIKE FOOD, HOUSING, MEDICAL CARE, AND HEATING?: NOT HARD AT ALL

## 2024-06-18 ASSESSMENT — PATIENT HEALTH QUESTIONNAIRE - PHQ9
SUM OF ALL RESPONSES TO PHQ9 QUESTIONS 1 & 2: 0
2. FEELING DOWN, DEPRESSED OR HOPELESS: NOT AT ALL
SUM OF ALL RESPONSES TO PHQ QUESTIONS 1-9: 0
SUM OF ALL RESPONSES TO PHQ QUESTIONS 1-9: 0
1. LITTLE INTEREST OR PLEASURE IN DOING THINGS: NOT AT ALL
SUM OF ALL RESPONSES TO PHQ QUESTIONS 1-9: 0
SUM OF ALL RESPONSES TO PHQ QUESTIONS 1-9: 0

## 2024-06-18 NOTE — PROGRESS NOTES
Rm:05    Chief Complaint   Patient presents with    New Patient    Annual Exam    Conversation about BP medication change - lost a lot of weight eventually wants to be able to stop meds     Stiffness in hands - mainly in the morning and when aopening things   \"Have you been to the ER, urgent care clinic since your last visit?  Hospitalized since your last visit?\"    NO    “Have you seen or consulted any other health care providers outside of [facility] since your last visit?”    [facility]           “Have you had a colorectal cancer screening such as a colonoscopy/FIT/Cologuard?    Has an appointment 06/28/2024 -  done every 3 years found pulps before but non cancerous  No colonoscopy on file  No cologuard on file  No FIT/FOBT on file   No flexible sigmoidoscopy on file         Click Here for Release of Records Request   
signature was used to authenticate this note.    --Arley Marcelo MD

## 2024-06-18 NOTE — PATIENT INSTRUCTIONS
You can stop the felodipine.  Check your blood pressure at home on a more regular basis.   Call and schedule an appt for the x rays of the hands.   Keep up the great work with your diet and exercise.   You can have your records sent from your PCP for the vaccination records and office notes including the most recent labs. Once they are reviewed you will be notified if any of them need to be repeated.

## 2024-06-19 LAB
CRP SERPL-MCNC: <0.29 MG/DL (ref 0–0.3)
ERYTHROCYTE [SEDIMENTATION RATE] IN BLOOD: 8 MM/HR (ref 0–30)

## 2024-06-19 ASSESSMENT — ENCOUNTER SYMPTOMS
SHORTNESS OF BREATH: 0
COUGH: 0
COLOR CHANGE: 0
EYES NEGATIVE: 1
RESPIRATORY NEGATIVE: 1
GASTROINTESTINAL NEGATIVE: 1

## 2024-06-20 LAB
CCP IGA+IGG SERPL IA-ACNC: 5 UNITS (ref 0–19)
RHEUMATOID FACT SERPL-ACNC: <10 IU/ML

## 2024-06-21 ENCOUNTER — HOSPITAL ENCOUNTER (OUTPATIENT)
Facility: HOSPITAL | Age: 59
Discharge: HOME | End: 2024-06-21
Attending: FAMILY MEDICINE
Payer: COMMERCIAL

## 2024-06-21 DIAGNOSIS — M79.641 BILATERAL HAND PAIN: ICD-10-CM

## 2024-06-21 DIAGNOSIS — M79.642 BILATERAL HAND PAIN: ICD-10-CM

## 2024-06-21 PROCEDURE — 73130 X-RAY EXAM OF HAND: CPT

## 2024-06-29 LAB
14-3-3 ETA AG SER IA-MCNC: <0.2 NG/ML
CCP IGA+IGG SERPL IA-ACNC: <20 UNITS
RHEUMATOID FACT SERPL-ACNC: <14 UNITS/ML

## 2024-08-15 RX ORDER — SEMAGLUTIDE 1.7 MG/.75ML
1.7 INJECTION, SOLUTION SUBCUTANEOUS
Qty: 9 ML | Refills: 3 | Status: SHIPPED | OUTPATIENT
Start: 2024-08-15 | End: 2025-08-15

## 2024-08-15 NOTE — TELEPHONE ENCOUNTER
Last appt: 06/18/2024  Next appt: None  Last refill: N/A  for # of tabs N/A  with # of refills N/A  Last known prescriber:  : Provider, MD Tamela  Last labs: 06/18/2024  Requested prescription: WEGOVY 1.7 MG/0.75ML SOAJ SC injection  Pharmacy: [facility] at [place] [address] - P [phone] - F [phone]  Best callback number: [phone]

## 2024-08-15 NOTE — TELEPHONE ENCOUNTER
Pt need refill on WEGOVY 1.7MG    PLEASE SEND TO PHARMACY   [facility] [place]

## 2024-08-26 ENCOUNTER — OFFICE VISIT (OUTPATIENT)
Age: 59
End: 2024-08-26
Payer: COMMERCIAL

## 2024-08-26 VITALS
WEIGHT: 178 LBS | HEIGHT: 63 IN | DIASTOLIC BLOOD PRESSURE: 83 MMHG | OXYGEN SATURATION: 95 % | BODY MASS INDEX: 31.54 KG/M2 | SYSTOLIC BLOOD PRESSURE: 120 MMHG | RESPIRATION RATE: 18 BRPM | HEART RATE: 68 BPM | TEMPERATURE: 98.5 F

## 2024-08-26 DIAGNOSIS — J01.00 ACUTE NON-RECURRENT MAXILLARY SINUSITIS: ICD-10-CM

## 2024-08-26 DIAGNOSIS — E78.00 HYPERCHOLESTEREMIA: ICD-10-CM

## 2024-08-26 DIAGNOSIS — Z13.1 SCREENING FOR DIABETES MELLITUS: ICD-10-CM

## 2024-08-26 DIAGNOSIS — I10 ESSENTIAL HYPERTENSION: Primary | ICD-10-CM

## 2024-08-26 PROCEDURE — 3079F DIAST BP 80-89 MM HG: CPT | Performed by: FAMILY MEDICINE

## 2024-08-26 PROCEDURE — 3074F SYST BP LT 130 MM HG: CPT | Performed by: FAMILY MEDICINE

## 2024-08-26 PROCEDURE — 99214 OFFICE O/P EST MOD 30 MIN: CPT | Performed by: FAMILY MEDICINE

## 2024-08-26 RX ORDER — CLOBETASOL PROPIONATE 0.5 MG/G
OINTMENT TOPICAL
COMMUNITY
Start: 2024-07-18

## 2024-08-26 RX ORDER — SIMVASTATIN 10 MG
10 TABLET ORAL NIGHTLY
Qty: 90 TABLET | Refills: 1 | Status: SHIPPED | OUTPATIENT
Start: 2024-08-26

## 2024-08-26 RX ORDER — HYDROCHLOROTHIAZIDE 25 MG/1
25 TABLET ORAL EVERY MORNING
Qty: 90 TABLET | Refills: 1 | Status: SHIPPED | OUTPATIENT
Start: 2024-08-26

## 2024-08-26 SDOH — ECONOMIC STABILITY: FOOD INSECURITY: WITHIN THE PAST 12 MONTHS, THE FOOD YOU BOUGHT JUST DIDN'T LAST AND YOU DIDN'T HAVE MONEY TO GET MORE.: NEVER TRUE

## 2024-08-26 SDOH — ECONOMIC STABILITY: FOOD INSECURITY: WITHIN THE PAST 12 MONTHS, YOU WORRIED THAT YOUR FOOD WOULD RUN OUT BEFORE YOU GOT MONEY TO BUY MORE.: NEVER TRUE

## 2024-08-26 SDOH — ECONOMIC STABILITY: INCOME INSECURITY: HOW HARD IS IT FOR YOU TO PAY FOR THE VERY BASICS LIKE FOOD, HOUSING, MEDICAL CARE, AND HEATING?: NOT HARD AT ALL

## 2024-08-26 ASSESSMENT — PATIENT HEALTH QUESTIONNAIRE - PHQ9
SUM OF ALL RESPONSES TO PHQ QUESTIONS 1-9: 0
SUM OF ALL RESPONSES TO PHQ9 QUESTIONS 1 & 2: 0
SUM OF ALL RESPONSES TO PHQ QUESTIONS 1-9: 0
2. FEELING DOWN, DEPRESSED OR HOPELESS: NOT AT ALL
SUM OF ALL RESPONSES TO PHQ QUESTIONS 1-9: 0
SUM OF ALL RESPONSES TO PHQ QUESTIONS 1-9: 0
1. LITTLE INTEREST OR PLEASURE IN DOING THINGS: NOT AT ALL

## 2024-08-26 ASSESSMENT — ENCOUNTER SYMPTOMS
SINUS PAIN: 1
GASTROINTESTINAL NEGATIVE: 1
COLOR CHANGE: 0
SORE THROAT: 0
COUGH: 0
EYES NEGATIVE: 1
SHORTNESS OF BREATH: 0
SINUS PRESSURE: 1
WHEEZING: 0
RESPIRATORY NEGATIVE: 1

## 2024-08-26 NOTE — PATIENT INSTRUCTIONS
Your medication are being adjusted for your blood pressure.  The triamterene is being discontinued and the HCTZ is being continued at the same dose. The simvastatin is being lowered from 20 mg to 10 mg.  Have your fasting labs done prior to the appt to discuss at the appt as well in 3 months.

## 2024-08-26 NOTE — PROGRESS NOTES
Filomena Kaye (:  1965) is a 59 y.o. female,Established patient, here for evaluation of the following chief complaint(s):  Hypertension, Discuss Medications, and Sinus Problem      Assessment & Plan   ASSESSMENT/PLAN:  1. Essential hypertension  Assessment & Plan:   Well-controlled, changes made today: discontinued triamterene and cont current dose of HCTZ. Pt with decreased BP medications with her weight loss.  Cont home BP monitoring with close follow up.  Will check CMP since adjusting medications particularly with potential electrolyte changes.  Orders:  -     hydroCHLOROthiazide (HYDRODIURIL) 25 MG tablet; Take 1 tablet by mouth every morning, Disp-90 tablet, R-1Normal  -     Comprehensive Metabolic Panel; Future  2. Acute non-recurrent maxillary sinusitis - since with persistent sx will treat with abx for 7 day course.  Information given in AVS for supportive care with sinus sx.  Cont nasal sprays and medications for seasonal allergies.   -     amoxicillin-clavulanate (AUGMENTIN) 875-125 MG per tablet; Take 1 tablet by mouth 2 times daily for 7 days, Disp-14 tablet, R-0Normal  3. Hypercholesteremia  Assessment & Plan:   Well-controlled, changes made today: decreased from 20 mg to 10 mg as since pt with weight loss and may have better control of chr health conditions.  Repeat labs pending for 3 mo to determine any additional medication recommendations.  Orders:  -     simvastatin (ZOCOR) 10 MG tablet; Take 1 tablet by mouth nightly, Disp-90 tablet, R-1Normal  -     Lipid Panel; Future  4. Screening for diabetes mellitus  -     Comprehensive Metabolic Panel; Future  -     Hemoglobin A1C; Future    Return in about 3 months (around 2024) for fasting labs PRIOR to appt AND 3 mo office visit to discuss the results.             Subjective   SUBJECTIVE/OBJECTIVE:  Pt presented as an est pt for routine follow up without significant interim hx.     1. HTN with hypercholesterolemia:  Felodipine  (SINGULAIR) 10 MG tablet, TAKE ONE TABLET BY MOUTH EVERY DAY. GENERIC FOR SINGULAIR, Disp: , Rfl:     cetirizine (ZYRTEC) 10 MG tablet, Take 1 tablet by mouth Every Day, Disp: , Rfl:     Azelastine-Fluticasone 137-50 MCG/ACT SUSP, SHAKE LIQUID AND USE 1 SPRAY IN EACH NOSTRIL TWICE DAILY Nasal, Disp: , Rfl:     fluticasone (FLONASE) 50 MCG/ACT nasal spray, INHALE 2 SPRAYS IN EACH NOSTRIL EVERY DAY, Disp: , Rfl:     naproxen (NAPROSYN) 375 MG tablet, Take 1 tablet by mouth 2 times daily as needed for Pain, Disp: 30 tablet, Rfl: 0    albuterol sulfate HFA (PROVENTIL;VENTOLIN;PROAIR) 108 (90 Base) MCG/ACT inhaler, Inhale 2 puffs into the lungs every 4 hours as needed, Disp: , Rfl:     albuterol (PROVENTIL) (2.5 MG/3ML) 0.083% nebulizer solution, Inhale into the lungs every 4 hours as needed, Disp: , Rfl:     fexofenadine (ALLEGRA) 180 MG tablet, Take by mouth, Disp: , Rfl:     fluticasone (FLOVENT HFA) 220 MCG/ACT inhaler, INHALE 2 PUFFS BY MOUTH TWICE DAILY AS DIRECTED, Disp: , Rfl:     sodium-potassium-mag sulfate (SUPREP) 17.5-3.13-1.6 GM/177ML SOLN solution, PLEASE FOLLOW INSTRUCTIONS GIVEN TO YOU IN THE OFFICE (Patient not taking: Reported on 8/26/2024), Disp: , Rfl:   Allergies   Allergen Reactions    Miqsk-Niins-Vjhxehc-Pramoxine Itching    Neomycin-Polymyxin-Hc      Other reaction(s): icthing    Oxymetazoline      Other reaction(s): Unknown    Sulfa Antibiotics Hives     Other reaction(s): hives/itching, Not available       Review of Systems   Constitutional: Negative.    HENT:  Positive for congestion, sinus pressure and sinus pain. Negative for ear discharge, ear pain and sore throat.    Eyes: Negative.  Negative for visual disturbance.   Respiratory: Negative.  Negative for cough, shortness of breath and wheezing.    Cardiovascular: Negative.  Negative for chest pain, palpitations and leg swelling.   Gastrointestinal: Negative.    Skin: Negative.  Negative for color change, pallor and wound.   Neurological:

## 2024-08-26 NOTE — PROGRESS NOTES
RM : 02    Chief Complaint   Patient presents with    Hypertension    Discuss Medications    Sinus Problem       Vitals:    08/26/24 1543   BP: 120/83   Pulse: 68   Resp: 18   Temp: 98.5 °F (36.9 °C)   SpO2: 95%         \"Have you been to the ER, urgent care clinic since your last visit?  Hospitalized since your last visit?\"    NO    “Have you seen or consulted any other health care providers outside of [facility] since your last visit?”    NO            Click Here for Release of Records Request      AVS  education, follow up, and recommendations provided and addressed with patient.

## 2024-08-27 NOTE — ASSESSMENT & PLAN NOTE
Well-controlled, changes made today: decreased from 20 mg to 10 mg as since pt with weight loss and may have better control of chr health conditions.  Repeat labs pending for 3 mo to determine any additional medication recommendations.

## 2024-08-27 NOTE — ASSESSMENT & PLAN NOTE
Well-controlled, changes made today: discontinued triamterene and cont current dose of HCTZ. Pt with decreased BP medications with her weight loss.  Cont home BP monitoring with close follow up.  Will check CMP since adjusting medications particularly with potential electrolyte changes.

## 2024-10-01 ENCOUNTER — OFFICE VISIT (OUTPATIENT)
Age: 59
End: 2024-10-01

## 2024-10-01 VITALS
HEART RATE: 69 BPM | TEMPERATURE: 98.9 F | RESPIRATION RATE: 16 BRPM | DIASTOLIC BLOOD PRESSURE: 87 MMHG | BODY MASS INDEX: 32.95 KG/M2 | OXYGEN SATURATION: 100 % | SYSTOLIC BLOOD PRESSURE: 128 MMHG | WEIGHT: 186 LBS

## 2024-10-01 DIAGNOSIS — M65.949 FLEXOR TENOSYNOVITIS OF THUMB: Primary | ICD-10-CM

## 2024-10-01 RX ORDER — IBUPROFEN 600 MG/1
600 TABLET, FILM COATED ORAL 3 TIMES DAILY PRN
Qty: 30 TABLET | Refills: 0 | Status: SHIPPED | OUTPATIENT
Start: 2024-10-01

## 2024-10-01 NOTE — PROGRESS NOTES
Filomena Kaye (:  1965) is a 59 y.o. female,Established patient, here for evaluation of the following chief complaint(s):  Finger Pain (Right thumb pain x1 week)      Assessment & Plan :  Visit Diagnoses and Associated Orders       Flexor tenosynovitis of thumb    -  Primary    ibuprofen (ADVIL;MOTRIN) 600 MG tablet [3844]      [facility] - Joe Connolly MD, Orthopedic Surgery (elbow, hand, wrist), [place] [REF62 Custom]                 Patient was seen today with pain to her right thumb for the past 1 week  There is no evidence of trauma or injury, I do not think x-rays are necessary today  Symptoms are consistent with a flexor tenosynovitis of the thumb  Treatment for this is fairly conservative, ibuprofen 3 times daily as needed for pain  Continue wearing the splint that you are currently wearing  Avoid movements and activities that aggravate the pain  If still having pain and symptoms in 1 to 2 weeks, please follow-up with orthopedics, referral provided  They may consider a glucocorticoid injection into the joint if symptoms are not subsiding       Subjective :  HPI     59 y.o. female presents with symptoms of pain and catching in her right thumb which has affected her for the past 1 week.  She denies any trauma or injury to the hand.  She does note that she recently changed her keyboard and has been holding her hands in a slightly different position.  Additionally she does travel frequently for work and frequently has to lift a moderately heavy suitcase.  She notes that she is having some locking/catching of her thumb.  Notices this primarily when moving her thumb from a flexed to an extended position.  Notices pain in the palmar aspect of the thumb, denies any significant pain to the dorsal aspect of her hand or wrist.  She has not had issues with this in the past.  Denies any weakness, numbness or tingling to her fingers.  She has been wearing a splint for the last few days with mild relief.  She

## 2024-10-01 NOTE — PATIENT INSTRUCTIONS
Patient was seen today with pain to her right thumb for the past 1 week  There is no evidence of trauma or injury, I do not think x-rays are necessary today  Symptoms are consistent with a flexor tenosynovitis of the thumb  Treatment for this is fairly conservative, ibuprofen 3 times daily as needed for pain  Continue wearing the splint that you are currently wearing  Avoid movements and activities that aggravate the pain  If still having pain and symptoms in 1 to 2 weeks, please follow-up with orthopedics, referral provided  They may consider a glucocorticoid injection into the joint if symptoms are not subsiding

## 2024-10-09 ENCOUNTER — TELEPHONE (OUTPATIENT)
Age: 59
End: 2024-10-09

## 2024-10-09 NOTE — TELEPHONE ENCOUNTER
----- Message from Princess GUTIERREZ sent at 10/9/2024 11:28 AM EDT -----  Regarding: [facility] Appointment Request  [facility] Appointment Request    Patient needs appointment for [facility] Appointment Type: ED Follow-Up.    Patient Requested Dates(s): Within this week or next week   Patient Requested Time: Morning    Provider Name: Charlette Marcelo MD    Reason for Appointment Request: Established Patient - Available appointments did not meet patient need  --------------------------------------------------------------------------------------------------------------------------    Relationship to Patient: Self     Call Back Information: OK to leave message on voicemail  Preferred Call Back Number: [phone]

## 2024-10-11 ENCOUNTER — OFFICE VISIT (OUTPATIENT)
Age: 59
End: 2024-10-11
Payer: COMMERCIAL

## 2024-10-11 VITALS
OXYGEN SATURATION: 96 % | DIASTOLIC BLOOD PRESSURE: 79 MMHG | SYSTOLIC BLOOD PRESSURE: 134 MMHG | BODY MASS INDEX: 32.43 KG/M2 | RESPIRATION RATE: 16 BRPM | HEART RATE: 65 BPM | HEIGHT: 63 IN | WEIGHT: 183 LBS | TEMPERATURE: 98.7 F

## 2024-10-11 DIAGNOSIS — M79.644 PAIN OF RIGHT THUMB: Primary | ICD-10-CM

## 2024-10-11 PROCEDURE — 99213 OFFICE O/P EST LOW 20 MIN: CPT | Performed by: FAMILY MEDICINE

## 2024-10-11 PROCEDURE — 3078F DIAST BP <80 MM HG: CPT | Performed by: FAMILY MEDICINE

## 2024-10-11 PROCEDURE — 3075F SYST BP GE 130 - 139MM HG: CPT | Performed by: FAMILY MEDICINE

## 2024-10-11 ASSESSMENT — PATIENT HEALTH QUESTIONNAIRE - PHQ9
SUM OF ALL RESPONSES TO PHQ QUESTIONS 1-9: 0
2. FEELING DOWN, DEPRESSED OR HOPELESS: NOT AT ALL
1. LITTLE INTEREST OR PLEASURE IN DOING THINGS: NOT AT ALL
SUM OF ALL RESPONSES TO PHQ QUESTIONS 1-9: 0
SUM OF ALL RESPONSES TO PHQ QUESTIONS 1-9: 0
SUM OF ALL RESPONSES TO PHQ9 QUESTIONS 1 & 2: 0
SUM OF ALL RESPONSES TO PHQ QUESTIONS 1-9: 0

## 2024-10-11 NOTE — PATIENT INSTRUCTIONS
Continue with the inflammatory medication and immobilization.   Call and schedule an appt with the hand specialist.

## 2024-10-11 NOTE — PROGRESS NOTES
Identified pt with two pt identifiers(name and ). Reviewed record in preparation for visit and have obtained necessary documentation. All patient medications has been reviewed.  Chief Complaint   Patient presents with    Follow-Up from Hospital         Health Maintenance Due   Topic    Pneumococcal 0-64 years Vaccine (1 of 2 - PCV)    Lipids     HIV screen     Hepatitis C screen     DTaP/Tdap/Td vaccine (1 - Tdap)    Diabetes screen     Shingles vaccine (1 of 2)    Respiratory Syncytial Virus (RSV) Pregnant or age 60 yrs+ (1 - 1-dose 60+ series)    Flu vaccine (1)    COVID-19 Vaccine (1 -  season)     Health Maintenance Review: Patient reminded of \"due or due soon\" health maintenance. I have asked the patient to contact his/her primary care provider (PCP) for follow-up on his/her health maintenance.        Wt Readings from Last 3 Encounters:   10/11/24 83 kg (183 lb)   10/01/24 84.4 kg (186 lb)   24 80.7 kg (178 lb)     Temp Readings from Last 3 Encounters:   10/11/24 98.7 °F (37.1 °C) (Oral)   10/01/24 98.9 °F (37.2 °C)   24 98.5 °F (36.9 °C) (Oral)     BP Readings from Last 3 Encounters:   10/11/24 134/79   10/01/24 128/87   24 120/83     Pulse Readings from Last 3 Encounters:   10/11/24 65   10/01/24 69   24 68       \"Have you been to the ER, urgent care clinic since your last visit?  Hospitalized since your last visit?\"    NO    “Have you seen or consulted any other health care providers outside of [facility] since your last visit?”    NO      
    An electronic signature was used to authenticate this note.    --Arley Marcelo MD

## 2024-10-18 DIAGNOSIS — M65.949 FLEXOR TENOSYNOVITIS OF THUMB: ICD-10-CM

## 2024-10-18 RX ORDER — IBUPROFEN 600 MG/1
600 TABLET, FILM COATED ORAL 3 TIMES DAILY PRN
Qty: 30 TABLET | Refills: 0 | Status: SHIPPED | OUTPATIENT
Start: 2024-10-18

## 2024-10-18 NOTE — TELEPHONE ENCOUNTER
Pt left a voice message requesting a refill for the Ibuprofen. Pt stated has an appointment with a hand specialist in one week and would like a refill until can be seen.     [phone]    Last appointment: 10/11/2024 MD Marcelo   Next appointment: 11/27/2024 MD Marcelo   Previous refill encounter(s):   10/01/2024 Ibuprofen #30. Last prescribed by SHARON Ayers.     For Pharmacy Admin Tracking Only    Program: Medication Refill  Intervention Detail: New Rx: 1, reason: Patient Preference  Time Spent (min): 5    Requested Prescriptions     Pending Prescriptions Disp Refills    ibuprofen (ADVIL;MOTRIN) 600 MG tablet 30 tablet 0     Sig: Take 1 tablet by mouth 3 times daily as needed for Pain

## 2024-11-01 ENCOUNTER — TELEPHONE (OUTPATIENT)
Age: 59
End: 2024-11-01

## 2024-11-01 DIAGNOSIS — U07.1 COVID-19: Primary | ICD-10-CM

## 2024-11-01 DIAGNOSIS — J45.20 MILD INTERMITTENT ASTHMA WITHOUT COMPLICATION: ICD-10-CM

## 2024-11-01 NOTE — TELEPHONE ENCOUNTER
Pt with hx of asthma and obesity.  Pt with household exposures to COVID.  Will treat with paxlovid due to high risk of complications. Rx sent to the pharmacy    \"Please call pt to advise that the main potential side effect of paxlovid is diarrhea.  Take for the 5 days.  Hold the cholesterol medication during this time due to concern for medication interaction.  You may experience rebound sx, but that may not warrant additional therapy.  Seek medical attention if with any concern for worsening shortness of breath or other complications.  Once you are feeling better, please get the additional labs for the kidney function, A1C, and cholesterol.   Feel better soon.\"

## 2024-11-14 ENCOUNTER — LAB (OUTPATIENT)
Age: 59
End: 2024-11-14

## 2024-11-14 DIAGNOSIS — E78.00 HYPERCHOLESTEREMIA: ICD-10-CM

## 2024-11-14 DIAGNOSIS — Z13.1 SCREENING FOR DIABETES MELLITUS: ICD-10-CM

## 2024-11-14 DIAGNOSIS — I10 ESSENTIAL HYPERTENSION: ICD-10-CM

## 2024-11-14 LAB
ALBUMIN SERPL-MCNC: 3.4 G/DL (ref 3.5–5)
ALBUMIN/GLOB SERPL: 1.1 (ref 1.1–2.2)
ALP SERPL-CCNC: 61 U/L (ref 45–117)
ALT SERPL-CCNC: 31 U/L (ref 12–78)
ANION GAP SERPL CALC-SCNC: 4 MMOL/L (ref 2–12)
AST SERPL-CCNC: 17 U/L (ref 15–37)
BILIRUB SERPL-MCNC: 0.6 MG/DL (ref 0.2–1)
BUN SERPL-MCNC: 15 MG/DL (ref 6–20)
BUN/CREAT SERPL: 19 (ref 12–20)
CALCIUM SERPL-MCNC: 9.6 MG/DL (ref 8.5–10.1)
CHLORIDE SERPL-SCNC: 107 MMOL/L (ref 97–108)
CHOLEST SERPL-MCNC: 227 MG/DL
CO2 SERPL-SCNC: 32 MMOL/L (ref 21–32)
CREAT SERPL-MCNC: 0.77 MG/DL (ref 0.55–1.02)
EST. AVERAGE GLUCOSE BLD GHB EST-MCNC: 105 MG/DL
GLOBULIN SER CALC-MCNC: 3.2 G/DL (ref 2–4)
GLUCOSE SERPL-MCNC: 88 MG/DL (ref 65–100)
HBA1C MFR BLD: 5.3 % (ref 4–5.6)
HDLC SERPL-MCNC: 68 MG/DL
HDLC SERPL: 3.3 (ref 0–5)
LDLC SERPL CALC-MCNC: 145.4 MG/DL (ref 0–100)
POTASSIUM SERPL-SCNC: 3.9 MMOL/L (ref 3.5–5.1)
PROT SERPL-MCNC: 6.6 G/DL (ref 6.4–8.2)
SODIUM SERPL-SCNC: 143 MMOL/L (ref 136–145)
TRIGL SERPL-MCNC: 68 MG/DL
VLDLC SERPL CALC-MCNC: 13.6 MG/DL

## 2024-11-14 RX ORDER — SIMVASTATIN 20 MG
20 TABLET ORAL NIGHTLY
Qty: 90 TABLET | Refills: 0 | Status: SHIPPED | OUTPATIENT
Start: 2024-11-14

## 2024-11-15 NOTE — RESULT ENCOUNTER NOTE
Call:  all of  your labs are within acceptable limits except:   Your cholesterol is above goal.  The LDL which is considered the bad cholesterol is at 145 with a goal of less than 100.  The HDL which is considered the good cholesterol is at 68 which is above the goal of 40.   So your cholesterol medication is being increased.  You can take 2 tabs of your current medication and a new prescription will be sent to your pharmacy.  Have this level repeated in 3 months.   Otherwise keep up the great work! Have a great weekend. Keep your routinely scheduled appts.

## 2024-12-05 ENCOUNTER — OFFICE VISIT (OUTPATIENT)
Age: 59
End: 2024-12-05
Payer: COMMERCIAL

## 2024-12-05 VITALS
HEIGHT: 63 IN | RESPIRATION RATE: 16 BRPM | OXYGEN SATURATION: 98 % | BODY MASS INDEX: 32.18 KG/M2 | TEMPERATURE: 98.3 F | WEIGHT: 181.6 LBS | DIASTOLIC BLOOD PRESSURE: 84 MMHG | SYSTOLIC BLOOD PRESSURE: 133 MMHG | HEART RATE: 68 BPM

## 2024-12-05 DIAGNOSIS — I10 ESSENTIAL HYPERTENSION: Primary | ICD-10-CM

## 2024-12-05 DIAGNOSIS — E78.00 HYPERCHOLESTEREMIA: ICD-10-CM

## 2024-12-05 PROCEDURE — 99214 OFFICE O/P EST MOD 30 MIN: CPT | Performed by: FAMILY MEDICINE

## 2024-12-05 PROCEDURE — 3075F SYST BP GE 130 - 139MM HG: CPT | Performed by: FAMILY MEDICINE

## 2024-12-05 PROCEDURE — 3079F DIAST BP 80-89 MM HG: CPT | Performed by: FAMILY MEDICINE

## 2024-12-05 SDOH — ECONOMIC STABILITY: FOOD INSECURITY: WITHIN THE PAST 12 MONTHS, YOU WORRIED THAT YOUR FOOD WOULD RUN OUT BEFORE YOU GOT MONEY TO BUY MORE.: NEVER TRUE

## 2024-12-05 SDOH — ECONOMIC STABILITY: FOOD INSECURITY: WITHIN THE PAST 12 MONTHS, THE FOOD YOU BOUGHT JUST DIDN'T LAST AND YOU DIDN'T HAVE MONEY TO GET MORE.: NEVER TRUE

## 2024-12-05 SDOH — ECONOMIC STABILITY: INCOME INSECURITY: HOW HARD IS IT FOR YOU TO PAY FOR THE VERY BASICS LIKE FOOD, HOUSING, MEDICAL CARE, AND HEATING?: NOT HARD AT ALL

## 2024-12-05 ASSESSMENT — ENCOUNTER SYMPTOMS
COUGH: 0
SHORTNESS OF BREATH: 0
RESPIRATORY NEGATIVE: 1
EYES NEGATIVE: 1
GASTROINTESTINAL NEGATIVE: 1
COLOR CHANGE: 0

## 2024-12-05 ASSESSMENT — PATIENT HEALTH QUESTIONNAIRE - PHQ9
SUM OF ALL RESPONSES TO PHQ9 QUESTIONS 1 & 2: 0
2. FEELING DOWN, DEPRESSED OR HOPELESS: NOT AT ALL
SUM OF ALL RESPONSES TO PHQ QUESTIONS 1-9: 0
1. LITTLE INTEREST OR PLEASURE IN DOING THINGS: NOT AT ALL
SUM OF ALL RESPONSES TO PHQ QUESTIONS 1-9: 0

## 2024-12-05 NOTE — ASSESSMENT & PLAN NOTE
Chronic, not at goal (unstable), continue current treatment plan. Increased to 20 mg. Awaiting repeat labs pending in Feb for further medication recommendations. Check yearly once at goal.

## 2024-12-05 NOTE — PATIENT INSTRUCTIONS
You can schedule a lab only fasting labs to check the cholesterol with the new dose in Feb (anytime after the 14th).    Keep up the great work! Continue your current medications.

## 2024-12-05 NOTE — PROGRESS NOTES
Filomena Kyae (:  1965) is a 59 y.o. female,Established patient, here for evaluation of the following chief complaint(s):  Follow-up and Results      Assessment & Plan   ASSESSMENT/PLAN:  1. Essential hypertension  Assessment & Plan:   Chronic, at goal (stable), continue current treatment plan.  Awaiting CBC, TSH, urine protein due in Feb with FLP.   Orders:  -     CBC with Auto Differential; Future  -     TSH; Future  -     Microalbumin / Creatinine Urine Ratio; Future  2. Hypercholesteremia  Assessment & Plan:   Chronic, not at goal (unstable), continue current treatment plan. Increased to 20 mg. Awaiting repeat labs pending in Feb for further medication recommendations. Check yearly once at goal.        Return for lab only appt for fasting labs in Feb AND in office appt in 6 months. .               Subjective   SUBJECTIVE/OBJECTIVE:  Pt presents as an est pt for routine follow up  Interim hx notable for evaluation at orthopedics for eval for thumb pain. 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 S/p steroid injection x 2.  If not improved will consider surgery.  She continues to immobilize her right thumb.  Personally reviewed OV and recommendations.    HM:   Flu vaccination UTD    HTN with hypercholesterolemia:   Complications: stroke/TIA: no, renal disease: no, CVD: no, eye: no  Current medications: HCTZ. Has been on felodipine which was discontinued.  Also was on triamterene HCTZ which was transitioned to HCTZ.  Incidentally she had slightly elevated blood pressure during a viral illness which resolved.   Medication compliance: taking daily  ASA: no  Statin:  yes - increased to 20 mg due to recent labs.   Hx of hypercholesterolemia/hyperlipidemia: uncontrolled.   Medication side effects: none  Medication allergies or intolerances of previously tried medications: none  Home BP monitoring: doing well.   ROS: all cardiac ROS negative.   Recent labs:   Lab Results   Component Value

## 2024-12-05 NOTE — ASSESSMENT & PLAN NOTE
Chronic, at goal (stable), continue current treatment plan.  Awaiting CBC, TSH, urine protein due in Feb with FLP.

## 2024-12-05 NOTE — PROGRESS NOTES
RM : 05    Chief Complaint   Patient presents with    Follow-up    Results       Vitals:    12/05/24 1503   BP: 133/84   Pulse: 68   Resp: 16   Temp: 98.3 °F (36.8 °C)   SpO2: 98%         \"Have you been to the ER, urgent care clinic since your last visit?  Hospitalized since your last visit?\"    NO    “Have you seen or consulted any other health care providers outside of [facility] since your last visit?”    Ortho             Click Here for Release of Records Request      AVS  education, follow up, and recommendations provided and addressed with patient.

## 2024-12-23 ENCOUNTER — TELEPHONE (OUTPATIENT)
Age: 59
End: 2024-12-23

## 2024-12-23 NOTE — TELEPHONE ENCOUNTER
Will await the form for evaluation and completion. Pt will be notified when it is ready for .  Pt amenable to waiting until after the New Year.

## 2024-12-23 NOTE — TELEPHONE ENCOUNTER
I spoke to the patient per 's request to gather information. The patient stated that her insurance plan will change in the new year and that she needed a form filled out by  for her Wegovy. The form is a tier exception form so that the patient can stay on the Wegovy and requires information as to why the patient can not try the alternatives. I informed that patient that she does not need an appointment to have the form completed. The patient will drop the form off by the end of the week.   Pt request that the form is completed after the new year.

## 2025-02-05 ENCOUNTER — OFFICE VISIT (OUTPATIENT)
Age: 60
End: 2025-02-05
Payer: COMMERCIAL

## 2025-02-05 VITALS
OXYGEN SATURATION: 98 % | WEIGHT: 183 LBS | DIASTOLIC BLOOD PRESSURE: 75 MMHG | HEART RATE: 67 BPM | RESPIRATION RATE: 16 BRPM | TEMPERATURE: 98.5 F | HEIGHT: 63 IN | SYSTOLIC BLOOD PRESSURE: 137 MMHG | BODY MASS INDEX: 32.43 KG/M2

## 2025-02-05 DIAGNOSIS — E78.00 HYPERCHOLESTEREMIA: ICD-10-CM

## 2025-02-05 DIAGNOSIS — I10 ESSENTIAL HYPERTENSION: Primary | ICD-10-CM

## 2025-02-05 DIAGNOSIS — E66.811 CLASS 1 OBESITY DUE TO EXCESS CALORIES WITH SERIOUS COMORBIDITY AND BODY MASS INDEX (BMI) OF 32.0 TO 32.9 IN ADULT: ICD-10-CM

## 2025-02-05 DIAGNOSIS — E66.09 CLASS 1 OBESITY DUE TO EXCESS CALORIES WITH SERIOUS COMORBIDITY AND BODY MASS INDEX (BMI) OF 32.0 TO 32.9 IN ADULT: ICD-10-CM

## 2025-02-05 DIAGNOSIS — M19.049 HAND ARTHRITIS: ICD-10-CM

## 2025-02-05 PROCEDURE — 3075F SYST BP GE 130 - 139MM HG: CPT | Performed by: FAMILY MEDICINE

## 2025-02-05 PROCEDURE — 3078F DIAST BP <80 MM HG: CPT | Performed by: FAMILY MEDICINE

## 2025-02-05 PROCEDURE — 99214 OFFICE O/P EST MOD 30 MIN: CPT | Performed by: FAMILY MEDICINE

## 2025-02-05 SDOH — ECONOMIC STABILITY: FOOD INSECURITY: WITHIN THE PAST 12 MONTHS, THE FOOD YOU BOUGHT JUST DIDN'T LAST AND YOU DIDN'T HAVE MONEY TO GET MORE.: NEVER TRUE

## 2025-02-05 SDOH — ECONOMIC STABILITY: FOOD INSECURITY: WITHIN THE PAST 12 MONTHS, YOU WORRIED THAT YOUR FOOD WOULD RUN OUT BEFORE YOU GOT MONEY TO BUY MORE.: NEVER TRUE

## 2025-02-05 ASSESSMENT — PATIENT HEALTH QUESTIONNAIRE - PHQ9
SUM OF ALL RESPONSES TO PHQ QUESTIONS 1-9: 0
SUM OF ALL RESPONSES TO PHQ9 QUESTIONS 1 & 2: 0
SUM OF ALL RESPONSES TO PHQ QUESTIONS 1-9: 0
SUM OF ALL RESPONSES TO PHQ QUESTIONS 1-9: 0
2. FEELING DOWN, DEPRESSED OR HOPELESS: NOT AT ALL
1. LITTLE INTEREST OR PLEASURE IN DOING THINGS: NOT AT ALL
SUM OF ALL RESPONSES TO PHQ QUESTIONS 1-9: 0

## 2025-02-05 NOTE — PROGRESS NOTES
Chief Complaint   Patient presents with    Follow-up     BP (!) 143/84   Pulse 67   Temp 98.5 °F (36.9 °C)   Resp 16   Ht 1.6 m (5' 3\")   Wt 83 kg (183 lb)   LMP  (LMP Unknown)   SpO2 98%   BMI 32.42 kg/m²   \"Have you been to the ER, urgent care clinic since your last visit?  Hospitalized since your last visit?\"    NO    “Have you seen or consulted any other health care providers outside our system since your last visit?”    NO    Have you had a mammogram?”   NO    Date of last Mammogram: 12/30/2022

## 2025-02-05 NOTE — PATIENT INSTRUCTIONS
Keep the routinely scheduled appt with the hand surgeon to discuss the options.    Schedule an appt for fasting labs within a month.    Continue your current medications.

## 2025-02-05 NOTE — PROGRESS NOTES
Filomena Kaye (:  1965) is a 60 y.o. female,Established patient, here for evaluation of the following chief complaint(s):  Follow-up      Assessment & Plan   ASSESSMENT/PLAN:  1. Essential hypertension - chronic, stable, at goal. Cont current medications.   2. Class 1 obesity due to excess calories with serious comorbidity and body mass index (BMI) of 32.0 to 32.9 in adult - chronic, stable, on current medications.  Due to pt CV risk factors, stimulant medications are precluded/contraindicated including but not limited to phentermine, benzphetamine, diethylproprion, phendimetrazine, adipex-P, and qsymia.  These medications are also not used for long term weight management which she is currently on with wegovy.  Pt currently tolerating without known SE.  Paperwork to be resubmitted for approval.   3. Hypercholesteremia - chronic, borderline control, awaiting repeat fasting labs due at the end of the month for further medication recommendations. Cont current medications.   4. Hand arthritis - chronic, management per specialist without findings meriting change in plan of care.       Return in about 6 months (around 2025) for hypertension.               Subjective   SUBJECTIVE/OBJECTIVE:  Pt presents as an est pt for routine follow up without significant interim hx.   Incidentally she is noticing continued pain in the right thumb s/p steroid injections.  She has been using topical voltaren gel prn QID which has been very helpful.  She is now considering surgery since it continues to be painful.  She is continuing with immobilization.   HM:  Awaiting mammogram report.      Pt presents for discussion of weight management medications:   Currently on wegovy at 1/7 mg weekly.    Denies any hx of other weight loss medications including phentermine, qsymia, adipex-P, etc.  Her resubmission for wegovy was denied   Discussed with pt CV risk factors and contraindication to stimulant medications particularly since

## 2025-02-14 DIAGNOSIS — E78.00 HYPERCHOLESTEREMIA: ICD-10-CM

## 2025-02-14 DIAGNOSIS — I10 ESSENTIAL HYPERTENSION: ICD-10-CM

## 2025-02-14 RX ORDER — HYDROCHLOROTHIAZIDE 25 MG/1
25 TABLET ORAL EVERY MORNING
Qty: 90 TABLET | Refills: 3 | Status: SHIPPED | OUTPATIENT
Start: 2025-02-14

## 2025-02-14 RX ORDER — SIMVASTATIN 20 MG
20 TABLET ORAL NIGHTLY
Qty: 90 TABLET | Refills: 0 | Status: SHIPPED | OUTPATIENT
Start: 2025-02-14

## 2025-02-14 NOTE — TELEPHONE ENCOUNTER
Last appointment: 02/05/2025 MD Marcelo   Next appointment: 06/05/2025 MD Marcelo   Previous refill encounter(s):   08/26/2024 HCTZ #90 with 1 refill.     For Pharmacy Admin Tracking Only    Program: Medication Refill  Intervention Detail: New Rx: 1, reason: Patient Preference  Time Spent (min): 5    Requested Prescriptions     Pending Prescriptions Disp Refills    hydroCHLOROthiazide (HYDRODIURIL) 25 MG tablet [Pharmacy Med Name: HYDROCHLOROTHIAZIDE 25MG TABLETS] 90 tablet 0     Sig: TAKE 1 TABLET BY MOUTH EVERY MORNING

## 2025-02-14 NOTE — TELEPHONE ENCOUNTER
Last appointment: 02/05/2025 MD Marcelo   Next appointment: 06/05/2025 MD Marcelo   Previous refill encounter(s):   11/14/2024 Zocor #90     For Pharmacy Admin Tracking Only    Program: Medication Refill  Intervention Detail: New Rx: 1, reason: Patient Preference  Time Spent (min): 5    Requested Prescriptions     Pending Prescriptions Disp Refills    simvastatin (ZOCOR) 20 MG tablet [Pharmacy Med Name: SIMVASTATIN 20MG TABLETS] 90 tablet 0     Sig: TAKE 1 TABLET BY MOUTH EVERY NIGHT

## 2025-02-17 ENCOUNTER — TELEPHONE (OUTPATIENT)
Age: 60
End: 2025-02-17

## 2025-02-17 NOTE — TELEPHONE ENCOUNTER
Patient requested that the following form(s) be completed  APPEAL FORMS  Blank copy scanned into chart Yes  Urgent request Yes  Where is the form located? PLACED IN PROVIDER BIN IN THE GANT  Requested completion date? AS SOON AS POSSIBLE

## 2025-02-19 ENCOUNTER — LAB (OUTPATIENT)
Age: 60
End: 2025-02-19

## 2025-02-19 DIAGNOSIS — I10 ESSENTIAL HYPERTENSION: ICD-10-CM

## 2025-02-19 DIAGNOSIS — E78.00 HYPERCHOLESTEREMIA: ICD-10-CM

## 2025-02-19 LAB
BASOPHILS # BLD: 0.06 K/UL (ref 0–0.1)
BASOPHILS NFR BLD: 1.5 % (ref 0–1)
CHOLEST SERPL-MCNC: 168 MG/DL
CREAT UR-MCNC: 177 MG/DL
DIFFERENTIAL METHOD BLD: ABNORMAL
EOSINOPHIL # BLD: 0.4 K/UL (ref 0–0.4)
EOSINOPHIL NFR BLD: 9.8 % (ref 0–7)
ERYTHROCYTE [DISTWIDTH] IN BLOOD BY AUTOMATED COUNT: 14.4 % (ref 11.5–14.5)
HCT VFR BLD AUTO: 44.5 % (ref 35–47)
HDLC SERPL-MCNC: 70 MG/DL
HDLC SERPL: 2.4 (ref 0–5)
HGB BLD-MCNC: 14.4 G/DL (ref 11.5–16)
IMM GRANULOCYTES # BLD AUTO: 0 K/UL (ref 0–0.04)
IMM GRANULOCYTES NFR BLD AUTO: 0 % (ref 0–0.5)
LDLC SERPL CALC-MCNC: 84 MG/DL (ref 0–100)
LYMPHOCYTES # BLD: 1.5 K/UL (ref 0.8–3.5)
LYMPHOCYTES NFR BLD: 36.6 % (ref 12–49)
MCH RBC QN AUTO: 27.9 PG (ref 26–34)
MCHC RBC AUTO-ENTMCNC: 32.4 G/DL (ref 30–36.5)
MCV RBC AUTO: 86.1 FL (ref 80–99)
MICROALBUMIN UR-MCNC: 0.64 MG/DL
MICROALBUMIN/CREAT UR-RTO: 4 MG/G (ref 0–30)
MONOCYTES # BLD: 0.37 K/UL (ref 0–1)
MONOCYTES NFR BLD: 9 % (ref 5–13)
NEUTS SEG # BLD: 1.77 K/UL (ref 1.8–8)
NEUTS SEG NFR BLD: 43.1 % (ref 32–75)
NRBC # BLD: 0 K/UL (ref 0–0.01)
NRBC BLD-RTO: 0 PER 100 WBC
PLATELET # BLD AUTO: 219 K/UL (ref 150–400)
PMV BLD AUTO: 10.2 FL (ref 8.9–12.9)
RBC # BLD AUTO: 5.17 M/UL (ref 3.8–5.2)
TRIGL SERPL-MCNC: 70 MG/DL
TSH SERPL DL<=0.05 MIU/L-ACNC: 2.74 UIU/ML (ref 0.36–3.74)
VLDLC SERPL CALC-MCNC: 14 MG/DL
WBC # BLD AUTO: 4.1 K/UL (ref 3.6–11)

## 2025-02-20 NOTE — RESULT ENCOUNTER NOTE
Letter: all of your labs are within acceptable limits.  The CBC shows a potential mild viral infection.  Follow up if you do not improve as expected or with any acute concerns.  The cholesterol is now normal!  Keep up the great work! Continue your current medications.  Keep your routinely scheduled appts.  Additionally, your prior authorization has been faxed for your medication.  Let us know if there are any issues.  See you soon.  Be safe and stay warm.

## 2025-02-27 ENCOUNTER — TELEPHONE (OUTPATIENT)
Age: 60
End: 2025-02-27

## 2025-02-27 NOTE — TELEPHONE ENCOUNTER
Please call pt and advise that we have received her preop clearance forms.  Please schedule an appt within the upcoming month to complete including the EKG and labs.    Additionally, the insurance is still processing the approval for the wegovy and additional documentation including office visits and labs were submitted.  Look forward to seeing you soon.

## 2025-03-05 ENCOUNTER — TELEPHONE (OUTPATIENT)
Age: 60
End: 2025-03-05

## 2025-03-07 ENCOUNTER — OFFICE VISIT (OUTPATIENT)
Age: 60
End: 2025-03-07

## 2025-03-07 VITALS
HEART RATE: 62 BPM | WEIGHT: 183 LBS | HEIGHT: 63 IN | DIASTOLIC BLOOD PRESSURE: 81 MMHG | BODY MASS INDEX: 32.43 KG/M2 | SYSTOLIC BLOOD PRESSURE: 133 MMHG | TEMPERATURE: 98.3 F | OXYGEN SATURATION: 99 % | RESPIRATION RATE: 16 BRPM

## 2025-03-07 DIAGNOSIS — Z01.818 PRE-OP EXAM: ICD-10-CM

## 2025-03-07 DIAGNOSIS — M18.11 ARTHRITIS OF CARPOMETACARPAL (CMC) JOINT OF RIGHT THUMB: ICD-10-CM

## 2025-03-07 DIAGNOSIS — M65.311 TRIGGER FINGER OF RIGHT THUMB: Primary | ICD-10-CM

## 2025-03-07 SDOH — ECONOMIC STABILITY: FOOD INSECURITY: WITHIN THE PAST 12 MONTHS, YOU WORRIED THAT YOUR FOOD WOULD RUN OUT BEFORE YOU GOT MONEY TO BUY MORE.: NEVER TRUE

## 2025-03-07 SDOH — ECONOMIC STABILITY: FOOD INSECURITY: WITHIN THE PAST 12 MONTHS, THE FOOD YOU BOUGHT JUST DIDN'T LAST AND YOU DIDN'T HAVE MONEY TO GET MORE.: NEVER TRUE

## 2025-03-07 ASSESSMENT — PATIENT HEALTH QUESTIONNAIRE - PHQ9
SUM OF ALL RESPONSES TO PHQ QUESTIONS 1-9: 0
1. LITTLE INTEREST OR PLEASURE IN DOING THINGS: NOT AT ALL
SUM OF ALL RESPONSES TO PHQ QUESTIONS 1-9: 0
SUM OF ALL RESPONSES TO PHQ QUESTIONS 1-9: 0
2. FEELING DOWN, DEPRESSED OR HOPELESS: NOT AT ALL
SUM OF ALL RESPONSES TO PHQ QUESTIONS 1-9: 0

## 2025-03-07 NOTE — PROGRESS NOTES
Chief Complaint   Patient presents with    Pre-op Exam         /81 (Site: Left Upper Arm, Position: Sitting, Cuff Size: Large Adult)   Pulse 62   Temp 98.3 °F (36.8 °C)   Resp 16   Ht 1.6 m (5' 3\")   Wt 83 kg (183 lb)   LMP  (LMP Unknown)   SpO2 99%   BMI 32.42 kg/m²   \"Have you been to the ER, urgent care clinic since your last visit?  Hospitalized since your last visit?\"    NO    “Have you seen or consulted any other health care providers outside our system since your last visit?”    NO

## 2025-03-07 NOTE — PROGRESS NOTES
Filomena Kaye (:  1965) is a 60 y.o. female,Established patient, here for evaluation of the following chief complaint(s):  Pre-op Exam    Assessment & Plan   ASSESSMENT/PLAN:    Anesthesia classification: 2  Mallampati score: 2    1. Trigger finger of right thumb  2. Arthritis of carpometacarpal (CMC) joint of right thumb  3. Pre-op exam - EKG without acute findings. Awaiting routine labs. If labs wnl, pt medically stable to proceed with aforementioned procedure without contraindication.   -     AMB POC EKG ROUTINE W/ 12 LEADS, SCREEN (-INITIAL PREV EXAM)  -     CBC with Auto Differential; Future  -     Comprehensive Metabolic Panel; Future  -     Hemoglobin A1C; Future  -     Urinalysis with Reflex to Culture; Future      Return if symptoms worsen or fail to improve.           Subjective   SUBJECTIVE/OBJECTIVE:  Date of surgery: 3/28/2025  Location: [facility]  Procedure: right thumb pulley release  Surgeon: Dr Flor Stevenson  Surgeon's phone and fax: [phone]  Type of anesthesia: general  Hx of latex allergy: none  Hx of allergy to adhesives: none  Hx of surgical complications: none  Hx of anesthesia complications: none  ASA/anticoagulants: none  Hx of renal disease: none  Hx of DM: none. Currently on wegovy for weight loss  Hx of HTN: none  Hx of CVD/stroke/TIA: none  Hx of sleep apnea/COPD/Asthma: on DIONTE transitioned from CPAP to oral appliance. Hx of asthma without hx of recent exacerbations. Currently well controlled  Functional status>4 METS:  yes  Smoking status: never  High risk medications reviewed prior to surgery: yes  Medication recommendations prior to surgery: no changes needed.     Past Medical History:   Diagnosis Date    Acid reflux     Allergic rhinitis     Asthma     Environmental allergies     Headache     Hypercholesteremia 2015    Hypertension 2011    Sleep apnea     Uterine fibroid      Past Surgical History:   Procedure Laterality

## 2025-03-19 DIAGNOSIS — Z01.818 PRE-OP EXAM: ICD-10-CM

## 2025-03-19 LAB
ALBUMIN SERPL-MCNC: 3.8 G/DL (ref 3.5–5)
ALBUMIN/GLOB SERPL: 1.2 (ref 1.1–2.2)
ALP SERPL-CCNC: 68 U/L (ref 45–117)
ALT SERPL-CCNC: 24 U/L (ref 12–78)
ANION GAP SERPL CALC-SCNC: 2 MMOL/L (ref 2–12)
APPEARANCE UR: CLEAR
AST SERPL-CCNC: 20 U/L (ref 15–37)
BACTERIA URNS QL MICRO: NEGATIVE /HPF
BASOPHILS # BLD: 0.09 K/UL (ref 0–0.1)
BASOPHILS NFR BLD: 1.9 % (ref 0–1)
BILIRUB SERPL-MCNC: 0.6 MG/DL (ref 0.2–1)
BILIRUB UR QL: NEGATIVE
BUN SERPL-MCNC: 14 MG/DL (ref 6–20)
BUN/CREAT SERPL: 21 (ref 12–20)
CALCIUM SERPL-MCNC: 9.7 MG/DL (ref 8.5–10.1)
CHLORIDE SERPL-SCNC: 104 MMOL/L (ref 97–108)
CO2 SERPL-SCNC: 33 MMOL/L (ref 21–32)
COLOR UR: NORMAL
CREAT SERPL-MCNC: 0.68 MG/DL (ref 0.55–1.02)
DIFFERENTIAL METHOD BLD: ABNORMAL
EOSINOPHIL # BLD: 0.56 K/UL (ref 0–0.4)
EOSINOPHIL NFR BLD: 11.7 % (ref 0–7)
EPITH CASTS URNS QL MICRO: NORMAL /LPF
ERYTHROCYTE [DISTWIDTH] IN BLOOD BY AUTOMATED COUNT: 14.1 % (ref 11.5–14.5)
EST. AVERAGE GLUCOSE BLD GHB EST-MCNC: 108 MG/DL
GLOBULIN SER CALC-MCNC: 3.3 G/DL (ref 2–4)
GLUCOSE SERPL-MCNC: 83 MG/DL (ref 65–100)
GLUCOSE UR STRIP.AUTO-MCNC: NEGATIVE MG/DL
HBA1C MFR BLD: 5.4 % (ref 4–5.6)
HCT VFR BLD AUTO: 42.9 % (ref 35–47)
HGB BLD-MCNC: 14 G/DL (ref 11.5–16)
HGB UR QL STRIP: NEGATIVE
HYALINE CASTS URNS QL MICRO: NORMAL /LPF (ref 0–5)
IMM GRANULOCYTES # BLD AUTO: 0 K/UL (ref 0–0.04)
IMM GRANULOCYTES NFR BLD AUTO: 0 % (ref 0–0.5)
KETONES UR QL STRIP.AUTO: NEGATIVE MG/DL
LEUKOCYTE ESTERASE UR QL STRIP.AUTO: NEGATIVE
LYMPHOCYTES # BLD: 1.63 K/UL (ref 0.8–3.5)
LYMPHOCYTES NFR BLD: 34 % (ref 12–49)
MCH RBC QN AUTO: 28.2 PG (ref 26–34)
MCHC RBC AUTO-ENTMCNC: 32.6 G/DL (ref 30–36.5)
MCV RBC AUTO: 86.3 FL (ref 80–99)
MONOCYTES # BLD: 0.42 K/UL (ref 0–1)
MONOCYTES NFR BLD: 8.8 % (ref 5–13)
NEUTS SEG # BLD: 2.1 K/UL (ref 1.8–8)
NEUTS SEG NFR BLD: 43.6 % (ref 32–75)
NITRITE UR QL STRIP.AUTO: NEGATIVE
NRBC # BLD: 0 K/UL (ref 0–0.01)
NRBC BLD-RTO: 0 PER 100 WBC
PH UR STRIP: 6.5 (ref 5–8)
PLATELET # BLD AUTO: 253 K/UL (ref 150–400)
PMV BLD AUTO: 10.3 FL (ref 8.9–12.9)
POTASSIUM SERPL-SCNC: 3.2 MMOL/L (ref 3.5–5.1)
PROT SERPL-MCNC: 7.1 G/DL (ref 6.4–8.2)
PROT UR STRIP-MCNC: NEGATIVE MG/DL
RBC # BLD AUTO: 4.97 M/UL (ref 3.8–5.2)
RBC #/AREA URNS HPF: NORMAL /HPF (ref 0–5)
SODIUM SERPL-SCNC: 139 MMOL/L (ref 136–145)
SP GR UR REFRACTOMETRY: 1.01 (ref 1–1.03)
URINE CULTURE IF INDICATED: NORMAL
UROBILINOGEN UR QL STRIP.AUTO: 0.2 EU/DL (ref 0.2–1)
WBC # BLD AUTO: 4.8 K/UL (ref 3.6–11)
WBC URNS QL MICRO: NORMAL /HPF (ref 0–4)

## 2025-03-20 ENCOUNTER — TELEPHONE (OUTPATIENT)
Age: 60
End: 2025-03-20

## 2025-03-20 ENCOUNTER — RESULTS FOLLOW-UP (OUTPATIENT)
Age: 60
End: 2025-03-20

## 2025-03-20 DIAGNOSIS — E87.6 DIURETIC-INDUCED HYPOKALEMIA: Primary | ICD-10-CM

## 2025-03-20 DIAGNOSIS — T50.2X5A DIURETIC-INDUCED HYPOKALEMIA: Primary | ICD-10-CM

## 2025-03-20 RX ORDER — POTASSIUM CHLORIDE 1500 MG/1
20 TABLET, EXTENDED RELEASE ORAL DAILY
Qty: 14 TABLET | Refills: 0 | Status: SHIPPED | OUTPATIENT
Start: 2025-03-20 | End: 2025-04-03

## 2025-03-20 NOTE — RESULT ENCOUNTER NOTE
Call:  all of your labs are within acceptable limits except:   1. The potassium is slightly low likely due to the fluid pill medication.  A supplemental potassium is being sent to the pharmacy.  Take as prescribed and you can have this level repeated in 1-2 wks.   2. The eosinophils are elevated likely due to your asthma.  Make sure to take your allergy medication.    The labs will be forwarded to the surgeon.  Wishing you a successful upcoming surgery and road to recovery. Have a great weekend.

## 2025-03-20 NOTE — TELEPHONE ENCOUNTER
Spoke with pt regarding recent labs provider prescribed Potassium supplement for low potassium level and encouraged pt to take allergy medication due to eosinophil levels being elevated due to asthma.

## 2025-04-25 ENCOUNTER — PATIENT MESSAGE (OUTPATIENT)
Age: 60
End: 2025-04-25

## 2025-04-25 ENCOUNTER — TELEMEDICINE (OUTPATIENT)
Age: 60
End: 2025-04-25
Payer: COMMERCIAL

## 2025-04-25 DIAGNOSIS — I10 ESSENTIAL HYPERTENSION: Primary | ICD-10-CM

## 2025-04-25 DIAGNOSIS — J45.30 MILD PERSISTENT ASTHMA, UNCOMPLICATED: ICD-10-CM

## 2025-04-25 DIAGNOSIS — J33.9 NASAL POLYP: ICD-10-CM

## 2025-04-25 DIAGNOSIS — R51.9 SINUS HEADACHE: ICD-10-CM

## 2025-04-25 DIAGNOSIS — E87.6 DIURETIC-INDUCED HYPOKALEMIA: ICD-10-CM

## 2025-04-25 DIAGNOSIS — E78.00 HYPERCHOLESTEREMIA: ICD-10-CM

## 2025-04-25 DIAGNOSIS — Z63.8 CAREGIVER ROLE STRAIN: ICD-10-CM

## 2025-04-25 DIAGNOSIS — Z86.69 HISTORY OF MIGRAINE HEADACHES: ICD-10-CM

## 2025-04-25 DIAGNOSIS — T50.2X5A DIURETIC-INDUCED HYPOKALEMIA: ICD-10-CM

## 2025-04-25 PROCEDURE — 99214 OFFICE O/P EST MOD 30 MIN: CPT | Performed by: FAMILY MEDICINE

## 2025-04-25 SDOH — SOCIAL STABILITY - SOCIAL INSECURITY: OTHER SPECIFIED PROBLEMS RELATED TO PRIMARY SUPPORT GROUP: Z63.8

## 2025-04-25 SDOH — ECONOMIC STABILITY: FOOD INSECURITY: WITHIN THE PAST 12 MONTHS, YOU WORRIED THAT YOUR FOOD WOULD RUN OUT BEFORE YOU GOT MONEY TO BUY MORE.: NEVER TRUE

## 2025-04-25 SDOH — ECONOMIC STABILITY: FOOD INSECURITY: WITHIN THE PAST 12 MONTHS, THE FOOD YOU BOUGHT JUST DIDN'T LAST AND YOU DIDN'T HAVE MONEY TO GET MORE.: NEVER TRUE

## 2025-04-25 ASSESSMENT — PATIENT HEALTH QUESTIONNAIRE - PHQ9
SUM OF ALL RESPONSES TO PHQ QUESTIONS 1-9: 0
2. FEELING DOWN, DEPRESSED OR HOPELESS: NOT AT ALL
1. LITTLE INTEREST OR PLEASURE IN DOING THINGS: NOT AT ALL
SUM OF ALL RESPONSES TO PHQ QUESTIONS 1-9: 0

## 2025-04-25 NOTE — PROGRESS NOTES
2025    TELEHEALTH EVALUATION -- Audio/Visual    HPI:    Filomena Kaye (:  1965) has requested an audio/video evaluation for the following concern(s):    History of Present Illness  The patient presents via virtual visit for evaluation of hypertension, asthma, and headaches.    Blood pressure monitoring at home has shown elevated readings, including 161/109, 164/108, and 159/110. However, a reading of 138/80 was recorded during an ENT visit today. Plans to purchase a wrist cuff for more accurate readings are mentioned. No complications from hypertension, such as stroke, heart attack, or kidney issues, are reported. Current medication includes hydrochlorothiazide 25 mg for blood pressure management.  Denies any current cardiac ROS except for mild headache.     An ENT appointment this morning indicated the need for surgery due to frequent headaches upon waking due to ethmoid sinus disease. A CT scan revealed polyps in the ethmoids, similar to a condition treated with surgery 11 years ago. Headaches do not interfere with concentration unless they escalate to migraines, at which point Excedrin for migraine relief is taken. Headaches typically resolve within an hour and a half and do not affect vision.    Accommodations are sought for hypertension, asthma, cholesterol, and headaches. A hybrid work schedule is currently in place, with a request for continuation for at least one year due to exacerbation of chronic conditions. Caregiver strain is noted, as assistance is provided to the mother with daily activities including but not limited to eating, dressing, bathing, wound care, toileting and transferring, for approximately 3 hours per day. She is assisted in her caregiving by her father and sister as well as additional hired caregivers who provided occasional relief for a few hours.  The mother has dementia and requires additional wound care for a pressure ulcer. Current medication includes simvastatin 20

## 2025-04-25 NOTE — PROGRESS NOTES
RM:virtual visit     Chief Complaint   Patient presents with    work accomadations        There were no vitals filed for this visit.     \"Have you been to the ER, urgent care clinic since your last visit?  Hospitalized since your last visit?\"    NO    “Have you seen or consulted any other health care providers outside of [facility] since your last visit?”    YES - When: approximately 1 days ago.  Where and Why: ENT for sinus headaches and asthma.      Click Here for Release of Records Request

## 2025-04-25 NOTE — PATIENT INSTRUCTIONS
Schedule an appt to repeat your labs at your earliest convenience.   Continue your current medications.   Keep the routinely scheduled appts with the specialists.

## 2025-04-28 ASSESSMENT — ENCOUNTER SYMPTOMS
GASTROINTESTINAL NEGATIVE: 1
EYES NEGATIVE: 1
COLOR CHANGE: 0
RESPIRATORY NEGATIVE: 1
SHORTNESS OF BREATH: 0
COUGH: 0

## 2025-04-30 ENCOUNTER — LAB (OUTPATIENT)
Age: 60
End: 2025-04-30

## 2025-04-30 ENCOUNTER — TELEPHONE (OUTPATIENT)
Age: 60
End: 2025-04-30

## 2025-04-30 DIAGNOSIS — E87.6 DIURETIC-INDUCED HYPOKALEMIA: ICD-10-CM

## 2025-04-30 DIAGNOSIS — T50.2X5A DIURETIC-INDUCED HYPOKALEMIA: ICD-10-CM

## 2025-04-30 NOTE — TELEPHONE ENCOUNTER
OV printed and letter created.  Waiting for  at lab only appt at which time she will BMP and Mag will be completed. Results to determine additional recommendations.

## 2025-04-30 NOTE — TELEPHONE ENCOUNTER
Pt called to request a copy of her \"Reasonable Accommodation\" paperwork that was already faxed, be provided to her either via My Chart or for pick-up.   Please follow up with pt when document is available. Pt coming in to office today for lab only appt at 3:00 p.m.  Pt specifically requested to have her potassium tested as well. Please follow up with pt to ensure this was the request and that an order will be in place for this afternoon's draw.

## 2025-05-01 ENCOUNTER — RESULTS FOLLOW-UP (OUTPATIENT)
Age: 60
End: 2025-05-01

## 2025-05-01 LAB
ANION GAP SERPL CALC-SCNC: 7 MMOL/L (ref 2–12)
BUN SERPL-MCNC: 12 MG/DL (ref 6–20)
BUN/CREAT SERPL: 15 (ref 12–20)
CALCIUM SERPL-MCNC: 9.8 MG/DL (ref 8.5–10.1)
CHLORIDE SERPL-SCNC: 104 MMOL/L (ref 97–108)
CO2 SERPL-SCNC: 29 MMOL/L (ref 21–32)
CREAT SERPL-MCNC: 0.82 MG/DL (ref 0.55–1.02)
GLUCOSE SERPL-MCNC: 96 MG/DL (ref 65–100)
MAGNESIUM SERPL-MCNC: 2.3 MG/DL (ref 1.6–2.4)
POTASSIUM SERPL-SCNC: 3.4 MMOL/L (ref 3.5–5.1)
SODIUM SERPL-SCNC: 140 MMOL/L (ref 136–145)

## 2025-05-01 NOTE — RESULT ENCOUNTER NOTE
Call:  the magnesium is within normal limits. However, the potassium is still slightly low at 3.4.  slightly improved from 3.2.  this is likely due to the hydrochlorothiazide.  Since you have concern for migraine headaches, there is an option to start a medication called candesartan which helps to prevent migraines in addition to treating blood pressure which will improve the potassium with the hydrochlorothiazide.  If you are amenable to starting this medication, it can be sent to your pharmacy.  Otherwise, if you would like to stay on the current medication increase the potassium in your diet and you can also try an electrolyte supplement such as powerade, pedialyte, or gatorade 3 times per week or if diluted daily.  Keep your routinely scheduled appts. Have a great weekend.

## 2025-05-02 NOTE — RESULT ENCOUNTER NOTE
Spoke with the pt to advise per above notes. Pt understood. She wants to hold off on starting the candesartan for now.

## 2025-05-19 DIAGNOSIS — E78.00 HYPERCHOLESTEREMIA: ICD-10-CM

## 2025-05-19 NOTE — TELEPHONE ENCOUNTER
Last appointment: 04/25/2025 Virtual visit  MD Marcelo   Next appointment: 06/05/2025 MD Marcelo   Previous refill encounter(s):   02/14/2025 Zocor #90     For Pharmacy Admin Tracking Only    Program: Medication Refill  Intervention Detail: New Rx: 1, reason: Patient Preference  Time Spent (min): 5    Requested Prescriptions     Pending Prescriptions Disp Refills    simvastatin (ZOCOR) 20 MG tablet [Pharmacy Med Name: SIMVASTATIN 20MG TABLETS] 90 tablet 0     Sig: TAKE 1 TABLET BY MOUTH EVERY NIGHT

## 2025-05-21 RX ORDER — SIMVASTATIN 20 MG
20 TABLET ORAL NIGHTLY
Qty: 90 TABLET | Refills: 3 | Status: SHIPPED | OUTPATIENT
Start: 2025-05-21

## 2025-06-06 ENCOUNTER — OFFICE VISIT (OUTPATIENT)
Age: 60
End: 2025-06-06
Payer: COMMERCIAL

## 2025-06-06 VITALS
TEMPERATURE: 97.6 F | SYSTOLIC BLOOD PRESSURE: 126 MMHG | BODY MASS INDEX: 32.25 KG/M2 | WEIGHT: 182 LBS | DIASTOLIC BLOOD PRESSURE: 85 MMHG | OXYGEN SATURATION: 97 % | HEIGHT: 63 IN | HEART RATE: 65 BPM | RESPIRATION RATE: 16 BRPM

## 2025-06-06 DIAGNOSIS — E87.6 DIURETIC-INDUCED HYPOKALEMIA: ICD-10-CM

## 2025-06-06 DIAGNOSIS — G43.009 MIGRAINE WITHOUT AURA AND WITHOUT STATUS MIGRAINOSUS, NOT INTRACTABLE: ICD-10-CM

## 2025-06-06 DIAGNOSIS — E66.09 CLASS 1 OBESITY DUE TO EXCESS CALORIES WITH SERIOUS COMORBIDITY AND BODY MASS INDEX (BMI) OF 32.0 TO 32.9 IN ADULT: ICD-10-CM

## 2025-06-06 DIAGNOSIS — T50.2X5A DIURETIC-INDUCED HYPOKALEMIA: ICD-10-CM

## 2025-06-06 DIAGNOSIS — I10 ESSENTIAL HYPERTENSION: Primary | ICD-10-CM

## 2025-06-06 DIAGNOSIS — E78.00 HYPERCHOLESTEREMIA: ICD-10-CM

## 2025-06-06 DIAGNOSIS — E66.811 CLASS 1 OBESITY DUE TO EXCESS CALORIES WITH SERIOUS COMORBIDITY AND BODY MASS INDEX (BMI) OF 32.0 TO 32.9 IN ADULT: ICD-10-CM

## 2025-06-06 PROCEDURE — 3079F DIAST BP 80-89 MM HG: CPT | Performed by: FAMILY MEDICINE

## 2025-06-06 PROCEDURE — 99214 OFFICE O/P EST MOD 30 MIN: CPT | Performed by: FAMILY MEDICINE

## 2025-06-06 PROCEDURE — 3074F SYST BP LT 130 MM HG: CPT | Performed by: FAMILY MEDICINE

## 2025-06-06 RX ORDER — SUMATRIPTAN 50 MG/1
50 TABLET, FILM COATED ORAL
Qty: 9 TABLET | Refills: 3 | Status: SHIPPED | OUTPATIENT
Start: 2025-06-06

## 2025-06-06 SDOH — ECONOMIC STABILITY: FOOD INSECURITY: WITHIN THE PAST 12 MONTHS, YOU WORRIED THAT YOUR FOOD WOULD RUN OUT BEFORE YOU GOT MONEY TO BUY MORE.: NEVER TRUE

## 2025-06-06 SDOH — ECONOMIC STABILITY: FOOD INSECURITY: WITHIN THE PAST 12 MONTHS, THE FOOD YOU BOUGHT JUST DIDN'T LAST AND YOU DIDN'T HAVE MONEY TO GET MORE.: NEVER TRUE

## 2025-06-06 ASSESSMENT — PATIENT HEALTH QUESTIONNAIRE - PHQ9
1. LITTLE INTEREST OR PLEASURE IN DOING THINGS: NOT AT ALL
SUM OF ALL RESPONSES TO PHQ QUESTIONS 1-9: 0
2. FEELING DOWN, DEPRESSED OR HOPELESS: NOT AT ALL
SUM OF ALL RESPONSES TO PHQ QUESTIONS 1-9: 0

## 2025-06-06 NOTE — PROGRESS NOTES
Filomena Kaye (:  1965) is a 60 y.o. female,Established patient, here for evaluation of the following chief complaint(s):  6 Month Follow-Up      Assessment & Plan   ASSESSMENT/PLAN:  Assessment & Plan  1.hyperlipidemia. chronic, stable, at goal. Cont current medications.  Labs due in Feb.  - Her cholesterol levels increased when the simvastatin dosage was reduced, necessitating a return to the 20 mg dose.  - She will continue taking simvastatin 20 mg daily.  - No side effects such as stomach pain, yellow skin, or body aches reported.  - The next set of blood work is scheduled for 2026.    2. Ess HTN. Chronic, stable, at goal. Cont current medications. Currently complicated by diuretic induced hypokalemia. Awaiting repeat labs today. Information given in AVS for high potassium diet.  Routine fasting labs ordered for Feb.   - Her blood pressure readings at home are generally good, around 130s/70s-80s.  - She will continue taking hydrochlorothiazide.  - Potassium level was 3.4 on 2025, close to the normal range of 3.5.  - There is no history of complications from hypertension such as stroke, heart attack, or kidney issues.  - Her potassium level was 3.4 on 2025, which is close to the normal range of 3.5.  - A recheck of her potassium level will be conducted today.  - Information about increasing potassium in her diet has been provided.  - She has been drinking sports drinks to help maintain potassium levels.    3. Class 1 obesity with serious comorbidity currently managed with wegovy  pt at her personal ideal weight. Cont current medications and encouraged increased exercise.    - She is currently on Wegovy 1.7 mg and has lost approximately 30 pounds.  - Advised to increase physical activity to about 150 minutes per week, which can be achieved by exercising for 30 minutes five times a week.  - Bone-strengthening exercises are recommended for osteoporosis prevention.  - A bone density test

## 2025-06-06 NOTE — PROGRESS NOTES
RM:5    Chief Complaint   Patient presents with    6 Month Follow-Up       Vitals:    06/06/25 1022 06/06/25 1023   BP: (!) 140/84 126/85   BP Site: Left Upper Arm Left Upper Arm   Patient Position: Sitting Sitting   BP Cuff Size: Large Adult Large Adult   Pulse: 65    Resp: 16    Temp: 97.6 °F (36.4 °C)    TempSrc: Oral    SpO2: 97%    Weight: 82.6 kg (182 lb)    Height: 1.6 m (5' 3\")         FASTING: No    \"Have you been to the ER, urgent care clinic since your last visit?  Hospitalized since your last visit?\"    NO    “Have you seen or consulted any other health care providers outside of [facility] since your last visit?”    Yes - DR. Kramer - dermatology             Click Here for Release of Records Request

## 2025-06-11 ENCOUNTER — LAB (OUTPATIENT)
Age: 60
End: 2025-06-11

## 2025-06-11 DIAGNOSIS — I10 ESSENTIAL HYPERTENSION: ICD-10-CM

## 2025-06-11 DIAGNOSIS — E87.6 DIURETIC-INDUCED HYPOKALEMIA: ICD-10-CM

## 2025-06-11 DIAGNOSIS — T50.2X5A DIURETIC-INDUCED HYPOKALEMIA: ICD-10-CM

## 2025-06-11 LAB
ANION GAP SERPL CALC-SCNC: 5 MMOL/L (ref 2–12)
BUN SERPL-MCNC: 14 MG/DL (ref 6–20)
BUN/CREAT SERPL: 16 (ref 12–20)
CALCIUM SERPL-MCNC: 9.4 MG/DL (ref 8.5–10.1)
CHLORIDE SERPL-SCNC: 104 MMOL/L (ref 97–108)
CO2 SERPL-SCNC: 31 MMOL/L (ref 21–32)
CREAT SERPL-MCNC: 0.88 MG/DL (ref 0.55–1.02)
GLUCOSE SERPL-MCNC: 79 MG/DL (ref 65–100)
POTASSIUM SERPL-SCNC: 3.3 MMOL/L (ref 3.5–5.1)
SODIUM SERPL-SCNC: 140 MMOL/L (ref 136–145)

## 2025-06-12 ENCOUNTER — RESULTS FOLLOW-UP (OUTPATIENT)
Age: 60
End: 2025-06-12

## 2025-06-12 DIAGNOSIS — E87.6 DIURETIC-INDUCED HYPOKALEMIA: Primary | ICD-10-CM

## 2025-06-12 DIAGNOSIS — T50.2X5A DIURETIC-INDUCED HYPOKALEMIA: Primary | ICD-10-CM

## 2025-06-16 RX ORDER — POTASSIUM CHLORIDE 1500 MG/1
20 TABLET, EXTENDED RELEASE ORAL DAILY
Qty: 90 TABLET | Refills: 1 | Status: SHIPPED | OUTPATIENT
Start: 2025-06-16

## 2025-07-28 DIAGNOSIS — E66.811 CLASS 1 OBESITY DUE TO EXCESS CALORIES WITH SERIOUS COMORBIDITY AND BODY MASS INDEX (BMI) OF 32.0 TO 32.9 IN ADULT: Primary | ICD-10-CM

## 2025-07-28 DIAGNOSIS — E66.09 CLASS 1 OBESITY DUE TO EXCESS CALORIES WITH SERIOUS COMORBIDITY AND BODY MASS INDEX (BMI) OF 32.0 TO 32.9 IN ADULT: Primary | ICD-10-CM

## 2025-07-28 RX ORDER — SEMAGLUTIDE 1.7 MG/.75ML
1.7 INJECTION, SOLUTION SUBCUTANEOUS
Qty: 9 ML | Refills: 0 | Status: SHIPPED | OUTPATIENT
Start: 2025-07-28 | End: 2026-07-28

## 2025-07-28 NOTE — TELEPHONE ENCOUNTER
Last appointment: 06/06/2025 MD Marcelo   Next appointment: Nothing scheduled  Previous refill encounter(s):   08/15/2024 Wegovy #9 ml with 3 refills.     For Pharmacy Admin Tracking Only    Program: Medication Refill  Intervention Detail: New Rx: 1, reason: Patient Preference  Time Spent (min): 5    Requested Prescriptions     Pending Prescriptions Disp Refills    WEGOVY 1.7 MG/0.75ML SOAJ SC injection [Pharmacy Med Name: WEGOVY 1.7 MG/0.75ML SUBCUTANEOUS SOLUTION AUTO-INJECTOR] 9 mL 0     Sig: INJECT 1.7 MG INTO THE SKIN EVERY 7 DAYS

## 2025-08-17 ENCOUNTER — OFFICE VISIT (OUTPATIENT)
Age: 60
End: 2025-08-17

## 2025-08-17 VITALS
RESPIRATION RATE: 14 BRPM | HEART RATE: 87 BPM | WEIGHT: 186.6 LBS | OXYGEN SATURATION: 94 % | DIASTOLIC BLOOD PRESSURE: 93 MMHG | TEMPERATURE: 98.6 F | SYSTOLIC BLOOD PRESSURE: 152 MMHG | BODY MASS INDEX: 33.06 KG/M2 | HEIGHT: 63 IN

## 2025-08-17 DIAGNOSIS — J45.21 MILD INTERMITTENT ASTHMA WITH EXACERBATION: Primary | ICD-10-CM

## 2025-08-17 RX ORDER — IPRATROPIUM BROMIDE AND ALBUTEROL SULFATE 2.5; .5 MG/3ML; MG/3ML
1 SOLUTION RESPIRATORY (INHALATION) ONCE
Status: COMPLETED | OUTPATIENT
Start: 2025-08-17 | End: 2025-08-17

## 2025-08-17 RX ORDER — METHYLPREDNISOLONE 4 MG/1
TABLET ORAL
Qty: 21 KIT | Refills: 0 | Status: SHIPPED | OUTPATIENT
Start: 2025-08-17

## 2025-08-17 RX ORDER — FLUTICASONE PROPIONATE 220 UG/1
2 AEROSOL, METERED RESPIRATORY (INHALATION) 2 TIMES DAILY
Qty: 12 G | Refills: 0 | Status: SHIPPED | OUTPATIENT
Start: 2025-08-17

## 2025-08-17 RX ADMIN — IPRATROPIUM BROMIDE AND ALBUTEROL SULFATE 1 DOSE: 2.5; .5 SOLUTION RESPIRATORY (INHALATION) at 14:27

## 2025-08-29 ENCOUNTER — TELEPHONE (OUTPATIENT)
Age: 60
End: 2025-08-29